# Patient Record
Sex: FEMALE | Race: WHITE | Employment: OTHER | URBAN - METROPOLITAN AREA
[De-identification: names, ages, dates, MRNs, and addresses within clinical notes are randomized per-mention and may not be internally consistent; named-entity substitution may affect disease eponyms.]

---

## 2017-02-02 ENCOUNTER — OFFICE VISIT (OUTPATIENT)
Dept: INTERNAL MEDICINE CLINIC | Age: 67
End: 2017-02-02

## 2017-02-02 ENCOUNTER — HOSPITAL ENCOUNTER (OUTPATIENT)
Dept: LAB | Age: 67
Discharge: HOME | End: 2017-02-02
Payer: MEDICARE

## 2017-02-02 VITALS
OXYGEN SATURATION: 100 % | HEIGHT: 68 IN | WEIGHT: 272 LBS | SYSTOLIC BLOOD PRESSURE: 140 MMHG | HEART RATE: 54 BPM | BODY MASS INDEX: 41.22 KG/M2 | DIASTOLIC BLOOD PRESSURE: 78 MMHG | RESPIRATION RATE: 18 BRPM | TEMPERATURE: 97.9 F

## 2017-02-02 DIAGNOSIS — I10 ESSENTIAL HYPERTENSION WITH GOAL BLOOD PRESSURE LESS THAN 140/90: Chronic | ICD-10-CM

## 2017-02-02 DIAGNOSIS — I10 ESSENTIAL HYPERTENSION WITH GOAL BLOOD PRESSURE LESS THAN 140/90: Primary | Chronic | ICD-10-CM

## 2017-02-02 DIAGNOSIS — E03.4 HYPOTHYROIDISM DUE TO ACQUIRED ATROPHY OF THYROID: Chronic | ICD-10-CM

## 2017-02-02 DIAGNOSIS — Z76.0 MEDICATION REFILL: ICD-10-CM

## 2017-02-02 DIAGNOSIS — Z51.81 MEDICATION MONITORING ENCOUNTER: ICD-10-CM

## 2017-02-02 LAB
ALBUMIN SERPL BCP-MCNC: 3.7 G/DL (ref 3.4–5)
ALBUMIN/GLOB SERPL: 1.2 {RATIO} (ref 0.8–1.7)
ALP SERPL-CCNC: 77 U/L (ref 45–117)
ALT SERPL-CCNC: 36 U/L (ref 13–56)
ANION GAP BLD CALC-SCNC: 8 MMOL/L (ref 3–18)
AST SERPL W P-5'-P-CCNC: 22 U/L (ref 15–37)
BASOPHILS # BLD AUTO: 0 K/UL (ref 0–0.06)
BASOPHILS # BLD: 0 % (ref 0–2)
BILIRUB SERPL-MCNC: 0.4 MG/DL (ref 0.2–1)
BUN SERPL-MCNC: 17 MG/DL (ref 7–18)
BUN/CREAT SERPL: 18 (ref 12–20)
CALCIUM SERPL-MCNC: 8.6 MG/DL (ref 8.5–10.1)
CHLORIDE SERPL-SCNC: 105 MMOL/L (ref 100–108)
CHOLEST SERPL-MCNC: 222 MG/DL
CO2 SERPL-SCNC: 29 MMOL/L (ref 21–32)
CREAT SERPL-MCNC: 0.97 MG/DL (ref 0.6–1.3)
DIFFERENTIAL METHOD BLD: NORMAL
EOSINOPHIL # BLD: 0.3 K/UL (ref 0–0.4)
EOSINOPHIL NFR BLD: 5 % (ref 0–5)
ERYTHROCYTE [DISTWIDTH] IN BLOOD BY AUTOMATED COUNT: 14.4 % (ref 11.6–14.5)
GLOBULIN SER CALC-MCNC: 3 G/DL (ref 2–4)
GLUCOSE SERPL-MCNC: 97 MG/DL (ref 74–99)
HCT VFR BLD AUTO: 41.8 % (ref 35–45)
HDLC SERPL-MCNC: 57 MG/DL (ref 40–60)
HDLC SERPL: 3.9 {RATIO} (ref 0–5)
HGB BLD-MCNC: 13.2 G/DL (ref 12–16)
LDLC SERPL CALC-MCNC: 127.2 MG/DL (ref 0–100)
LIPID PROFILE,FLP: ABNORMAL
LYMPHOCYTES # BLD AUTO: 36 % (ref 21–52)
LYMPHOCYTES # BLD: 2.3 K/UL (ref 0.9–3.6)
MCH RBC QN AUTO: 28.7 PG (ref 24–34)
MCHC RBC AUTO-ENTMCNC: 31.6 G/DL (ref 31–37)
MCV RBC AUTO: 90.9 FL (ref 74–97)
MONOCYTES # BLD: 0.5 K/UL (ref 0.05–1.2)
MONOCYTES NFR BLD AUTO: 7 % (ref 3–10)
NEUTS SEG # BLD: 3.4 K/UL (ref 1.8–8)
NEUTS SEG NFR BLD AUTO: 52 % (ref 40–73)
PLATELET # BLD AUTO: 318 K/UL (ref 135–420)
PMV BLD AUTO: 10.7 FL (ref 9.2–11.8)
POTASSIUM SERPL-SCNC: 4.2 MMOL/L (ref 3.5–5.5)
PROT SERPL-MCNC: 6.7 G/DL (ref 6.4–8.2)
RBC # BLD AUTO: 4.6 M/UL (ref 4.2–5.3)
SODIUM SERPL-SCNC: 142 MMOL/L (ref 136–145)
T4 FREE SERPL-MCNC: 1.1 NG/DL (ref 0.7–1.5)
TRIGL SERPL-MCNC: 189 MG/DL (ref ?–150)
TSH SERPL DL<=0.05 MIU/L-ACNC: 2.02 UIU/ML (ref 0.36–3.74)
VLDLC SERPL CALC-MCNC: 37.8 MG/DL
WBC # BLD AUTO: 6.4 K/UL (ref 4.6–13.2)

## 2017-02-02 PROCEDURE — 80053 COMPREHEN METABOLIC PANEL: CPT | Performed by: INTERNAL MEDICINE

## 2017-02-02 PROCEDURE — 85025 COMPLETE CBC W/AUTO DIFF WBC: CPT | Performed by: INTERNAL MEDICINE

## 2017-02-02 PROCEDURE — 36415 COLL VENOUS BLD VENIPUNCTURE: CPT | Performed by: INTERNAL MEDICINE

## 2017-02-02 PROCEDURE — 80061 LIPID PANEL: CPT | Performed by: INTERNAL MEDICINE

## 2017-02-02 PROCEDURE — 84439 ASSAY OF FREE THYROXINE: CPT | Performed by: INTERNAL MEDICINE

## 2017-02-02 RX ORDER — METOPROLOL SUCCINATE 25 MG/1
25 TABLET, EXTENDED RELEASE ORAL DAILY
Qty: 90 TAB | Refills: 5 | Status: SHIPPED | OUTPATIENT
Start: 2017-02-02 | End: 2018-03-25 | Stop reason: SDUPTHER

## 2017-02-02 RX ORDER — LEVOTHYROXINE SODIUM 100 UG/1
TABLET ORAL
Qty: 108 TAB | Refills: 5 | Status: SHIPPED | OUTPATIENT
Start: 2017-02-02 | End: 2018-02-28 | Stop reason: SDUPTHER

## 2017-02-02 RX ORDER — AMLODIPINE BESYLATE 5 MG/1
TABLET ORAL
Qty: 90 TAB | Refills: 5 | Status: SHIPPED | OUTPATIENT
Start: 2017-02-02 | End: 2018-05-01 | Stop reason: SDUPTHER

## 2017-02-02 RX ORDER — LEVOTHYROXINE SODIUM 100 UG/1
TABLET ORAL
Qty: 108 TAB | Refills: 5 | Status: SHIPPED | OUTPATIENT
Start: 2017-02-02 | End: 2017-02-02 | Stop reason: SDUPTHER

## 2017-02-02 RX ORDER — TRIAMTERENE AND HYDROCHLOROTHIAZIDE 75; 50 MG/1; MG/1
1 TABLET ORAL
Qty: 90 TAB | Refills: 5 | Status: SHIPPED | OUTPATIENT
Start: 2017-02-02

## 2017-02-02 RX ORDER — TRAMADOL HYDROCHLORIDE 50 MG/1
50 TABLET ORAL
Qty: 90 TAB | Refills: 5 | Status: SHIPPED | OUTPATIENT
Start: 2017-02-02 | End: 2019-06-27 | Stop reason: SDUPTHER

## 2017-02-02 NOTE — PROGRESS NOTES
ROOM # 4    Pt presents today for 4 month follow up HTN    Pt preferred language for health care discussion is english. Is someone accompanying this pt? no    Is the patient using any DME equipment during OV? no    Depression Screening completed. Yes    Learning Assessment completed. Yes    Abuse Screening completed. Yes    Health Maintenance reviewed and discussed per provider. Yes    Pt is due for FIT test, Bone Density, Pneumo, MWV, Eye exam.  Please order/place referral if appropriate. Advance Directive:  1. Do you have an advance directive in place? Patient Reply: No    2. If not, would you like material regarding how to put one in place? Patient Reply: No    Coordination of Care:  1. Have you been to the ER, urgent care clinic since your last visit? Hospitalized since your last visit? No    2. Have you seen or consulted any other health care providers outside of the [facility] since your last visit? Include any pap smears or colon screening.  No

## 2017-02-02 NOTE — MR AVS SNAPSHOT
Visit Information Date & Time Provider Department Dept. Phone Encounter #  
 2/2/2017  8:30 AM Kirk Pro"[employer] [phone] [id number] Follow-up Instructions Return in about 4 months (around 6/2/2017) for Welcome to Medicare Exam, . Upcoming Health Maintenance Date Due FOBT Q 1 YEAR AGE 50-75 12/10/2001 GLAUCOMA SCREENING Q2Y 12/10/2016 OSTEOPOROSIS SCREENING (DEXA) 12/10/2016 Pneumococcal 65+ Low/Medium Risk (1 of 2 - PCV13) 12/10/2016 MEDICARE YEARLY EXAM 12/10/2016 BREAST CANCER SCRN MAMMOGRAM 11/27/2017 DTaP/Tdap/Td series (2 - Td) 10/11/2020 Allergies as of 2/2/2017  Review Complete On: 2/2/2017 By: Kirk Pro MD  
  
 Severity Noted Reaction Type Reactions Erythromycin  10/22/2013    Swelling Current Immunizations  Never Reviewed Name Date TDAP Vaccine 10/11/2010 Not reviewed this visit You Were Diagnosed With   
  
 Codes Comments Essential hypertension with goal blood pressure less than 140/90    -  Primary ICD-10-CM: I10 
ICD-9-CM: 401.9 Hypothyroidism due to acquired atrophy of thyroid     ICD-10-CM: E03.4 ICD-9-CM: 244.8, 246.8 Medication refill     ICD-10-CM: Z76.0 ICD-9-CM: V68.1 Medication monitoring encounter     ICD-10-CM: Z51.81 
ICD-9-CM: V58.83 Vitals BP Pulse Temp Resp Height(growth percentile) Weight(growth percentile) 140/78 (!) 54 97.9 °F (36.6 °C) (Oral) 18 5' 8\" (1.727 m) 272 lb (123.4 kg) SpO2 BMI OB Status Smoking Status 100% 41.36 kg/m2 Hysterectomy Never Smoker Vitals History BMI and BSA Data Body Mass Index Body Surface Area  
 41.36 kg/m 2 2.43 m 2 Preferred Pharmacy Pharmacy Name Phone [address] [facility] [phone] Your Updated Medication List  
  
   
 This list is accurate as of: 2/2/17  9:05 AM.  Always use your most recent med list. amLODIPine 5 mg tablet Commonly known as:  Glory De Leon TAKE ONE TABLET BY MOUTH ONCE DAILY. INDICATIONA: HYPERTENSION  Indications: hypertension  
  
 levothyroxine 100 mcg tablet Commonly known as:  SYNTHROID  
TAKE TWO TABLETS BY MOUTH ON SUNDAY AND WEDNESDAY BEFORE BREAKFAST AND TAKE ONE TABLET BEFORE BREAKFAST ON ALL OTHER DAYS  Indications: hypothyroidism  
  
 metoprolol succinate 25 mg XL tablet Commonly known as:  TOPROL-XL Take 1 Tab by mouth daily. Indications: hypertension MOTRIN 800 mg tablet Generic drug:  ibuprofen Take 800 mg by mouth every eight (8) hours as needed for Pain.  
  
 traMADol 50 mg tablet Commonly known as:  ULTRAM  
Take 1 Tab by mouth every six (6) hours as needed for Pain.  
  
 triamterene-hydroCHLOROthiazide 75-50 mg per tablet Commonly known as:  [employer] [place] Take 1 Tab by mouth daily as needed. Indications: Edema Prescriptions Printed Refills  
 levothyroxine (SYNTHROID) 100 mcg tablet 5 Sig: TAKE TWO TABLETS BY MOUTH ON SUNDAY AND WEDNESDAY BEFORE BREAKFAST AND TAKE ONE TABLET BEFORE BREAKFAST ON ALL OTHER DAYS  Indications: hypothyroidism Class: Print  
 traMADol (ULTRAM) 50 mg tablet 5 Sig: Take 1 Tab by mouth every six (6) hours as needed for Pain. Class: Print Route: Oral  
  
Prescriptions Sent to Pharmacy Refills  
 amLODIPine (NORVASC) 5 mg tablet 5 Sig: TAKE ONE TABLET BY MOUTH ONCE DAILY. INDICATIONA: HYPERTENSION  Indications: hypertension Class: Normal  
 Pharmacy: [address], [address] Ph #: [phone]  
 metoprolol succinate (TOPROL-XL) 25 mg XL tablet 5 Sig: Take 1 Tab by mouth daily. Indications: hypertension Class: Normal  
 Pharmacy: [address], [address] Ph #: [phone]  Route: Oral  
 triamterene-hydroCHLOROthiazide (MAXZIDE) 75-50 mg per tablet 5 Sig: Take 1 Tab by mouth daily as needed. Indications: Edema Class: Normal  
 Pharmacy: [address], [address] [place] [address] #: [phone] Route: Oral  
  
Follow-up Instructions Return in about 4 months (around 6/2/2017) for Welcome to Medicare Exam, . To-Do List   
 02/02/2017 Lab:  CBC WITH AUTOMATED DIFF   
  
 02/02/2017 Lab:  LIPID PANEL   
  
 02/02/2017 Lab:  METABOLIC PANEL, COMPREHENSIVE   
  
 02/02/2017 Lab:  TSH AND FREE T4 Introducing [facility]! Dear Evangelina Luque: 
Thank you for requesting a "[employer]" account. Our records indicate that you already have an active "[employer]" account. You can access your account anytime at [email or web address] Did you know that you can access your hospital and ER discharge instructions at any time in "[employer]"? You can also review all of your test results from your hospital stay or ER visit. Additional Information If you have questions, please visit the Frequently Asked Questions section of the "[employer]" website at [email or web address] Remember, "[employer]" is NOT to be used for urgent needs. For medical emergencies, dial 911. Now available from your iPhone and Android! Please provide this summary of care documentation to your next provider. Your primary care clinician is listed as [facility]. If you have any questions after today's visit, please call [phone].

## 2017-02-02 NOTE — PROGRESS NOTES
HISTORY OF PRESENT ILLNESS  Brielle Potter is a 73 y.o. female. Visit Vitals    /73    Pulse (!) 54    Temp 97.9 °F (36.6 °C) (Oral)    Resp 18    Ht 5' 8\" (1.727 m)    Wt 272 lb (123.4 kg)    SpO2 100%    BMI 41.36 kg/m2       HPI Comments: Pt to have arthroscopic surgery of right knee later this month. Dr. aFnny Saba    Also recent URI with cough that lingered a month. But she managed to visit her family in [place]. Hypertension    The history is provided by the patient. This is a chronic problem. The current episode started more than 1 week ago. The problem has not changed since onset. Risk factors include obesity and a sedentary lifestyle. Review of Systems   Constitutional: Negative. Respiratory: Negative. Cardiovascular: Negative. Neurological: Negative. Physical Exam   Constitutional: She is oriented to person, place, and time. She appears well-developed and well-nourished. No distress. Cardiovascular: Normal rate and regular rhythm. Pulmonary/Chest: Effort normal and breath sounds normal.   Musculoskeletal: She exhibits edema (trace). Neurological: She is alert and oriented to person, place, and time. Skin: Skin is warm and dry. She is not diaphoretic. Psychiatric: She has a normal mood and affect. Nursing note and vitals reviewed. ASSESSMENT and PLAN    ICD-10-CM ICD-9-CM    1. Essential hypertension with goal blood pressure less than 140/90 I10 401.9 amLODIPine (NORVASC) 5 mg tablet      METABOLIC PANEL, COMPREHENSIVE      LIPID PANEL   2. Hypothyroidism due to acquired atrophy of thyroid E03.4 244.8 TSH AND FREE T4     246.8    3. Medication refill Z76.0 V68.1 triamterene-hydroCHLOROthiazide (MAXZIDE) 75-50 mg per tablet      traMADol (ULTRAM) 50 mg tablet       BP up --but took her meds late.  Coming down    update lab    F/u 4 months for Welcome to Medicare Exam

## 2017-03-20 ENCOUNTER — IMPORTED ENCOUNTER (OUTPATIENT)
Dept: URBAN - METROPOLITAN AREA CLINIC 1 | Facility: CLINIC | Age: 67
End: 2017-03-20

## 2017-03-20 PROBLEM — H01.004: Noted: 2017-03-20

## 2017-03-20 PROBLEM — H04.123: Noted: 2017-03-20

## 2017-03-20 PROBLEM — H16.143: Noted: 2017-03-20

## 2017-03-20 PROBLEM — H40.023: Noted: 2017-03-20

## 2017-03-20 PROBLEM — H01.001: Noted: 2017-03-20

## 2017-03-20 PROBLEM — H25.813: Noted: 2017-03-20

## 2017-03-20 PROCEDURE — 92015 DETERMINE REFRACTIVE STATE: CPT

## 2017-03-20 PROCEDURE — 92133 CPTRZD OPH DX IMG PST SGM ON: CPT

## 2017-03-20 PROCEDURE — 92012 INTRM OPH EXAM EST PATIENT: CPT

## 2017-03-20 NOTE — PATIENT DISCUSSION
1.  Glaucoma Suspect OU (CD 0.80 OU) : OCT essentially normal OU. Patient is considered high risk. Condition was discussed with patient and patient understands. Will continue to monitor patient for any progression in condition. Patient was advised to call us with any problems questions or concerns. 2.  CANDY w/ PEK OU-The use/continuation of artificial tears were recommended. 3.  Cataract OU: Observe for now without intervention. The patient was advised to contact us if any change or worsening of vision4. Blepharitis anterior type OU - Daily warm compresses and lid scrubs were recommended. Return for an appointment in 1 year 30/OCT/glare with Dr. Deja Patel.

## 2017-06-06 ENCOUNTER — OFFICE VISIT (OUTPATIENT)
Dept: INTERNAL MEDICINE CLINIC | Age: 67
End: 2017-06-06

## 2017-06-06 VITALS
DIASTOLIC BLOOD PRESSURE: 78 MMHG | SYSTOLIC BLOOD PRESSURE: 136 MMHG | WEIGHT: 273 LBS | HEART RATE: 53 BPM | RESPIRATION RATE: 18 BRPM | BODY MASS INDEX: 41.37 KG/M2 | HEIGHT: 68 IN | OXYGEN SATURATION: 100 % | TEMPERATURE: 97.7 F

## 2017-06-06 DIAGNOSIS — Z12.31 ENCOUNTER FOR SCREENING MAMMOGRAM FOR MALIGNANT NEOPLASM OF BREAST: ICD-10-CM

## 2017-06-06 DIAGNOSIS — Z00.00 ROUTINE GENERAL MEDICAL EXAMINATION AT A HEALTH CARE FACILITY: ICD-10-CM

## 2017-06-06 DIAGNOSIS — Z23 ENCOUNTER FOR IMMUNIZATION: ICD-10-CM

## 2017-06-06 DIAGNOSIS — Z12.11 SCREEN FOR COLON CANCER: ICD-10-CM

## 2017-06-06 DIAGNOSIS — Z13.39 SCREENING FOR ALCOHOLISM: ICD-10-CM

## 2017-06-06 DIAGNOSIS — Z78.0 POSTMENOPAUSAL: ICD-10-CM

## 2017-06-06 RX ORDER — DICLOFENAC SODIUM 50 MG/1
50 TABLET, DELAYED RELEASE ORAL 2 TIMES DAILY
Qty: 60 TAB | Refills: 11 | Status: SHIPPED | OUTPATIENT
Start: 2017-06-06 | End: 2017-09-12 | Stop reason: SDUPTHER

## 2017-06-06 RX ORDER — DICLOFENAC SODIUM 50 MG/1
TABLET, DELAYED RELEASE ORAL 2 TIMES DAILY
COMMUNITY
End: 2017-06-06 | Stop reason: SDUPTHER

## 2017-06-06 NOTE — PROGRESS NOTES
This is a \"Welcome to [employer]"  Initial Preventive Physical Examination (IPPE) providing Personalized Prevention Plan Services (Performed in the first 12 months of enrollment)    I have reviewed the patient's medical history in detail and updated the computerized patient record. History     Past Medical History:   Diagnosis Date    Delivery normal 1429,9941,0185    DJD (degenerative joint disease) of knee     hiatal hernia     Immunization, viral disease unk    hepatitis B series    Thyroid disease       Past Surgical History:   Procedure Laterality Date    ENDOSCOPY, COLON, DIAGNOSTIC  2007    HAND/FINGER SURGERY UNLISTED  2008    trigger finger    HX CYSTOCELE REPAIR      HX HYSTERECTOMY  1983    partial    HX KNEE ARTHROSCOPY Left 9/21/15    Dr. Lj Ambriz    HX TONSIL AND ADENOIDECTOMY      NASAL SURG PROC UNLISTED      polyps    REMOVAL GALLBLADDER      REPAIR OF RECTOCELE      REV LOWER EYELID EXTEN FAT PAD  9/18/2013     Current Outpatient Prescriptions   Medication Sig Dispense Refill    diclofenac EC (VOLTAREN) 50 mg EC tablet Take  by mouth two (2) times a day.  amLODIPine (NORVASC) 5 mg tablet TAKE ONE TABLET BY MOUTH ONCE DAILY. INDICATIONA: HYPERTENSION  Indications: hypertension 90 Tab 5    metoprolol succinate (TOPROL-XL) 25 mg XL tablet Take 1 Tab by mouth daily. Indications: hypertension 90 Tab 5    triamterene-hydroCHLOROthiazide (MAXZIDE) 75-50 mg per tablet Take 1 Tab by mouth daily as needed. Indications: Edema 90 Tab 5    traMADol (ULTRAM) 50 mg tablet Take 1 Tab by mouth every six (6) hours as needed for Pain. 90 Tab 5    levothyroxine (SYNTHROID) 100 mcg tablet TAKE TWO TABLETS BY MOUTH ON SUNDAY AND WEDNESDAY BEFORE BREAKFAST AND TAKE ONE TABLET BEFORE BREAKFAST ON ALL OTHER DAYS 108 Tab 5    ibuprofen (MOTRIN) 800 mg tablet Take 800 mg by mouth every eight (8) hours as needed for Pain. Allergies   Allergen Reactions    Erythromycin Swelling     Family History   Problem Relation Age of Onset    Hypertension Mother    [employer] Diabetes Mother     Arthritis-osteo Mother     Thyroid Disease Sister     Thyroid Disease Brother      Social History   Substance Use Topics    Smoking status: Never Smoker    Smokeless tobacco: Never Used    Alcohol use No     Diet, Lifestyle: exercises sporadically, nonsmoker, caffeine intake 1-2 drinks per day, alcohol intake rare, generally does not follow any diet    Exercise level: not active    Depression Risk Screen     PHQ over the last two weeks 6/6/2017   Little interest or pleasure in doing things Not at all   Feeling down, depressed or hopeless Not at all   Total Score PHQ 2 0     Alcohol Risk Screen   On any occasion during the past 3 months, have you had more than 3 drinks containing alcohol? No    Do you average more than 7 drinks per week? No    Functional Ability and Level of Safety     Hearing Loss   none    Activities of Daily Living   Self-care     Fall Risk Screen     Fall Risk Assessment, last 12 mths 6/6/2017   Able to walk? Yes   Fall in past 12 months? No     Abuse Screen   Patient is not abused    Review of Systems   A comprehensive review of systems was negative except for that written in the HPI. Leg pain bilat since knee replacement last year. Hand pain and stiffness    Physical Examination     No exam data present     Visit Vitals    /78    Pulse (!) 53    Temp 97.7 °F (36.5 °C) (Oral)    Resp 18    Ht 5' 8\" (1.727 m)    Wt 273 lb (123.8 kg)    SpO2 100%    BMI 41.51 kg/m2     General appearance: alert, cooperative, no distress, appears stated age  Eyes: conjunctivae/corneas clear. PERRL, EOM's intact.  Fundi benign  Throat: Lips, mucosa, and tongue normal. Teeth and gums normal  Lungs: clear to auscultation bilaterally  Breasts: normal appearance, no masses or tenderness  Heart: regular rate and rhythm, S1, S2 normal, no murmur, click, rub or gallop  Abdomen: soft, non-tender. Bowel sounds normal. No masses,  no organomegaly  Extremities: extremities normal, atraumatic, no cyanosis or edema. Good pulses in feet. Some varicose veins noted. Nothing obvious to cause leg pain. Neg SLRs. NML rerflexes  Neurologic: Grossly normal  3/3 objects remembered      Discussed weight, lifestyle, diet and exercise. Discussed her obesity and her leg pain. Offered to refer to Weight Loss Surgery      EKG Screening: normal EKG, normal sinus rhythm, unchanged from previous tracings. Patient Care Team:  Regla Alcazar MD as PCP - General (Internal Medicine)  Sophia Harris MD as Consulting Provider (Ophthalmology)  Irma Dillard MD as Consulting Provider (Orthopedic Surgery)     End-of-life planning  Advanced Directive discussed and documented: pt has some paperwork but needs to update it.  is her [de-identified], daughter is next. No life prolonging measures if poor hope of recovery. Assessment/Plan   Education and counseling provided:  Are appropriate based on today's review and evaluation  Pneumococcal Vaccine  Screening Mammography  Bone mass measurement (DEXA)    ICD-10-CM ICD-9-CM    1. Routine general medical examination at a health care facility Z00.00 V70.0 AMB POC EKG ROUTINE W/ 12 LEADS, SCREEN ()   2. Screening for alcoholism Z13.89 V79.1    3. Postmenopausal Z78.0 V49.81 DEXA BONE DENSITY STUDY AXIAL   4. Screen for colon cancer Z12.11 V76.51 REFERRAL TO GASTROENTEROLOGY   5. Encounter for immunization Z23 V03.89 ADMIN PNEUMOCOCCAL VACCINE      PNEUMOCOCCAL POLYSACCHARIDE VACCINE, 23-VALENT, ADULT OR IMMUNOSUPPRESSED PT DOSE,   6. Encounter for screening mammogram for malignant neoplasm of breast Z12.31 V76.12 RENEE MAMMO BI SCREENING INCL CAD   . Will f/u 3-4 months for BP check .   Encouraged to lose weight

## 2017-06-06 NOTE — MR AVS SNAPSHOT
Visit Information Date & Time Provider Department Dept. Phone Encounter #  
 6/6/2017  8:30 AM Chelo Gonzalez, [address] [facility] [phone] [id number] Follow-up Instructions Return in about 14 weeks (around 9/12/2017) for htn, thryoid disorder, cholesterol. Upcoming Health Maintenance Date Due FOBT Q 1 YEAR AGE 50-75 12/10/2001 GLAUCOMA SCREENING Q2Y 12/10/2016 OSTEOPOROSIS SCREENING (DEXA) 12/10/2016 Pneumococcal 65+ Low/Medium Risk (1 of 2 - PCV13) 12/10/2016 MEDICARE YEARLY EXAM 12/10/2016 INFLUENZA AGE 9 TO ADULT 8/1/2017 BREAST CANCER SCRN MAMMOGRAM 11/27/2017 DTaP/Tdap/Td series (2 - Td) 10/11/2020 Allergies as of 6/6/2017  Review Complete On: 6/6/2017 By: Chelo Gonzalez MD  
  
 Severity Noted Reaction Type Reactions Erythromycin  10/22/2013    Swelling Current Immunizations  Never Reviewed Name Date Pneumococcal Polysaccharide (PPSV-23)  Incomplete TDAP Vaccine 10/11/2010 Not reviewed this visit You Were Diagnosed With   
  
 Codes Comments Routine general medical examination at a health care facility     ICD-10-CM: Z00.00 ICD-9-CM: V70.0 Screening for alcoholism     ICD-10-CM: Z13.89 ICD-9-CM: V79.1 Postmenopausal     ICD-10-CM: Z78.0 ICD-9-CM: V49.81 Screen for colon cancer     ICD-10-CM: Z12.11 ICD-9-CM: V76.51 Encounter for immunization     ICD-10-CM: P42 ICD-9-CM: V03.89 Encounter for screening mammogram for malignant neoplasm of breast     ICD-10-CM: Z12.31 
ICD-9-CM: V76.12 Vitals BP Pulse Temp Resp Height(growth percentile) Weight(growth percentile) 136/78 (!) 53 97.7 °F (36.5 °C) (Oral) 18 5' 8\" (1.727 m) 273 lb (123.8 kg) SpO2 BMI OB Status Smoking Status 100% 41.51 kg/m2 Hysterectomy Never Smoker Vitals History BMI and BSA Data Body Mass Index Body Surface Area  41.51 kg/m 2 2.44 m 2  
  
  
 Preferred Pharmacy Pharmacy Name Phone [facility] [address] [facility], [facility] Your Updated Medication List  
  
   
This list is accurate as of: 6/6/17  9:11 AM.  Always use your most recent med list. amLODIPine 5 mg tablet Commonly known as:  Coni [employer] TAKE ONE TABLET BY MOUTH ONCE DAILY. INDICATIONA: HYPERTENSION  Indications: hypertension  
  
 diclofenac EC 50 mg EC tablet Commonly known as:  VOLTAREN Take 1 Tab by mouth two (2) times a day. Indications: OSTEOARTHRITIS  
  
 levothyroxine 100 mcg tablet Commonly known as:  SYNTHROID  
TAKE TWO TABLETS BY MOUTH ON SUNDAY AND WEDNESDAY BEFORE BREAKFAST AND TAKE ONE TABLET BEFORE BREAKFAST ON ALL OTHER DAYS  
  
 metoprolol succinate 25 mg XL tablet Commonly known as:  TOPROL-XL Take 1 Tab by mouth daily. Indications: hypertension MOTRIN 800 mg tablet Generic drug:  ibuprofen Take 800 mg by mouth every eight (8) hours as needed for Pain.  
  
 traMADol 50 mg tablet Commonly known as:  ULTRAM  
Take 1 Tab by mouth every six (6) hours as needed for Pain.  
  
 triamterene-hydroCHLOROthiazide 75-50 mg per tablet Commonly known as:  Ovid Muscat Take 1 Tab by mouth daily as needed. Indications: Edema Prescriptions Sent to Pharmacy Refills  
 diclofenac EC (VOLTAREN) 50 mg EC tablet 11 Sig: Take 1 Tab by mouth two (2) times a day. Indications: OSTEOARTHRITIS Class: Normal  
 Pharmacy: [address] [place] [address] #: [phone] Route: Oral  
  
We Performed the Following ADMIN PNEUMOCOCCAL VACCINE [ [facility]] AMB POC EKG ROUTINE W/ 12 LEADS, SCREEN () [ [facility]] PNEUMOCOCCAL POLYSACCHARIDE VACCINE, 23-VALENT, ADULT OR IMMUNOSUPPRESSED PT DOSE, [86451 CPT(R)] REFERRAL TO GASTROENTEROLOGY [CPG13 Custom] Follow-up Instructions Return in about 14 weeks (around 9/12/2017) for htn, thryoid disorder, cholesterol. To-Do List   
 06/09/2017 Imaging:  DEXA BONE DENSITY STUDY AXIAL   
  
 06/09/2017 Imaging:  RENEE MAMMO BI SCREENING INCL CAD Referral Information Referral ID Referred By Referred To [id number], [place]   
   [address] [place], [address] [facility] [address] Phone: [phone] Fax: [phone] Visits Status Start Date End Date 1 New Request 6/6/17 6/6/18 If your referral has a status of pending review or denied, additional information will be sent to support the outcome of this decision. Introducing [address]! Dear Shelton April: 
Thank you for requesting a [employer] account. Our records indicate that you already have an active [employer] account. You can access your account anytime at [email or web address] Did you know that you can access your hospital and ER discharge instructions at any time in [employer]? You can also review all of your test results from your hospital stay or ER visit. Additional Information If you have questions, please visit the Frequently Asked Questions section of the [employer] website at [email or web address] Remember, [employer] is NOT to be used for urgent needs. For medical emergencies, dial 911. Now available from your iPhone and Android! Please provide this summary of care documentation to your next provider. Your primary care clinician is listed as [facility]. If you have any questions after today's visit, please call [phone].

## 2017-06-06 NOTE — PROGRESS NOTES
Chief Complaint   Patient presents with   [address] [facility] Annual Wellness Visit     Welcome to Medicare physical       Pt preferred language for health care discussion is english. Is someone accompanying this pt? no    Is the patient using any DME equipment during OV? no    Depression Screening completed. Yes    Learning Assessment completed. Yes    Abuse Screening completed. Ys    Health Maintenance reviewed and discussed per provider. Yes    Pt is due for FIT test, Bone Density, Eye exam, Pneumo-13 or Peumo-23. Please order/place referral if appropriate. Advance Directive:  1. Do you have an advance directive in place? Patient Reply:no    2. If not, would you like material regarding how to put one in place? Patient Reply: No    Coordination of Care:  1. Have you been to the ER, urgent care clinic since your last visit? Hospitalized since your last visit? no    2. Have you seen or consulted any other health care providers outside of the [address] [place] [address] since your last visit? Include any pap smears or colon screening.  no

## 2017-09-12 ENCOUNTER — HOSPITAL ENCOUNTER (OUTPATIENT)
Dept: LAB | Age: 67
Discharge: HOME | End: 2017-09-12
Payer: MEDICARE

## 2017-09-12 ENCOUNTER — OFFICE VISIT (OUTPATIENT)
Dept: INTERNAL MEDICINE CLINIC | Age: 67
End: 2017-09-12

## 2017-09-12 VITALS
WEIGHT: 272.8 LBS | SYSTOLIC BLOOD PRESSURE: 134 MMHG | HEART RATE: 50 BPM | HEIGHT: 68 IN | OXYGEN SATURATION: 99 % | DIASTOLIC BLOOD PRESSURE: 67 MMHG | RESPIRATION RATE: 16 BRPM | BODY MASS INDEX: 41.34 KG/M2 | TEMPERATURE: 98.1 F

## 2017-09-12 DIAGNOSIS — E78.5 DYSLIPIDEMIA: ICD-10-CM

## 2017-09-12 DIAGNOSIS — I10 ESSENTIAL HYPERTENSION: Primary | Chronic | ICD-10-CM

## 2017-09-12 DIAGNOSIS — I10 ESSENTIAL HYPERTENSION: Chronic | ICD-10-CM

## 2017-09-12 DIAGNOSIS — E03.4 HYPOTHYROIDISM DUE TO ACQUIRED ATROPHY OF THYROID: Chronic | ICD-10-CM

## 2017-09-12 DIAGNOSIS — M19.91 PRIMARY OSTEOARTHRITIS, UNSPECIFIED SITE: ICD-10-CM

## 2017-09-12 DIAGNOSIS — N28.9 ACUTE RENAL INSUFFICIENCY: Primary | ICD-10-CM

## 2017-09-12 LAB
ALBUMIN SERPL-MCNC: 3.8 G/DL (ref 3.4–5)
ALBUMIN/GLOB SERPL: 1.2 {RATIO} (ref 0.8–1.7)
ALP SERPL-CCNC: 81 U/L (ref 45–117)
ALT SERPL-CCNC: 36 U/L (ref 13–56)
ANION GAP SERPL CALC-SCNC: 8 MMOL/L (ref 3–18)
AST SERPL-CCNC: 26 U/L (ref 15–37)
BILIRUB SERPL-MCNC: 0.3 MG/DL (ref 0.2–1)
BUN SERPL-MCNC: 31 MG/DL (ref 7–18)
BUN/CREAT SERPL: 19 (ref 12–20)
CALCIUM SERPL-MCNC: 9.3 MG/DL (ref 8.5–10.1)
CHLORIDE SERPL-SCNC: 106 MMOL/L (ref 100–108)
CHOLEST SERPL-MCNC: 247 MG/DL
CO2 SERPL-SCNC: 28 MMOL/L (ref 21–32)
CREAT SERPL-MCNC: 1.61 MG/DL (ref 0.6–1.3)
GLOBULIN SER CALC-MCNC: 3.2 G/DL (ref 2–4)
GLUCOSE SERPL-MCNC: 114 MG/DL (ref 74–99)
HDLC SERPL-MCNC: 56 MG/DL (ref 40–60)
HDLC SERPL: 4.4 {RATIO} (ref 0–5)
LDLC SERPL CALC-MCNC: 148.6 MG/DL (ref 0–100)
LIPID PROFILE,FLP: ABNORMAL
POTASSIUM SERPL-SCNC: 4.5 MMOL/L (ref 3.5–5.5)
PROT SERPL-MCNC: 7 G/DL (ref 6.4–8.2)
SODIUM SERPL-SCNC: 142 MMOL/L (ref 136–145)
T4 FREE SERPL-MCNC: 1.3 NG/DL (ref 0.7–1.5)
TRIGL SERPL-MCNC: 212 MG/DL (ref ?–150)
TSH SERPL DL<=0.05 MIU/L-ACNC: 3.88 UIU/ML (ref 0.36–3.74)
VLDLC SERPL CALC-MCNC: 42.4 MG/DL

## 2017-09-12 PROCEDURE — 84439 ASSAY OF FREE THYROXINE: CPT | Performed by: INTERNAL MEDICINE

## 2017-09-12 PROCEDURE — 80061 LIPID PANEL: CPT | Performed by: INTERNAL MEDICINE

## 2017-09-12 PROCEDURE — 36415 COLL VENOUS BLD VENIPUNCTURE: CPT | Performed by: INTERNAL MEDICINE

## 2017-09-12 PROCEDURE — 80053 COMPREHEN METABOLIC PANEL: CPT | Performed by: INTERNAL MEDICINE

## 2017-09-12 RX ORDER — DICLOFENAC SODIUM 50 MG/1
50 TABLET, DELAYED RELEASE ORAL 2 TIMES DAILY
Qty: 180 TAB | Refills: 4 | Status: SHIPPED | OUTPATIENT
Start: 2017-09-12 | End: 2018-09-27 | Stop reason: SDUPTHER

## 2017-09-12 RX ORDER — PRAVASTATIN SODIUM 40 MG/1
40 TABLET ORAL
Qty: 30 TAB | Refills: 5 | Status: SHIPPED | OUTPATIENT
Start: 2017-09-12 | End: 2019-06-27

## 2017-09-12 NOTE — PROGRESS NOTES
Chief Complaint   Patient presents with    Hypertension    Thyroid Problem    Cholesterol Problem       Pt preferred language for health care discussion is English. Is someone accompanying this pt? no    Is the patient using any DME equipment during OV? no    Depression Screening:  PHQ over the last two weeks 9/12/2017 6/6/2017 2/2/2017 9/12/2016 6/7/2016 4/21/2016 6/24/2015   Little interest or pleasure in doing things Not at all Not at all Not at all Not at all Not at all Not at all Not at all   Feeling down, depressed or hopeless Not at all Not at all Not at all Not at all Not at all Not at all Not at all   Total Score PHQ 2 0 0 0 0 0 0 0       Learning Assessment:  Learning Assessment 6/24/2015   PRIMARY LEARNER Patient   HIGHEST LEVEL OF EDUCATION - PRIMARY LEARNER  [address] PRIMARY LEARNER NONE   PRIMARY LANGUAGE ENGLISH   LEARNER PREFERENCE PRIMARY DEMONSTRATION   ANSWERED BY Patient   RELATIONSHIP SELF       Abuse Screening:  Abuse Screening Questionnaire 6/24/2015   Do you ever feel afraid of your partner? N   Are you in a relationship with someone who physically or mentally threatens you? N   Is it safe for you to go home? Y       Fall Risk  Fall Risk Assessment, last 12 mths 9/12/2017 6/6/2017 2/2/2017   Able to walk? Yes Yes Yes   Fall in past 12 months? No No No         Health Maintenance reviewed and discussed per provider. Yes    Pt is due for  Bone Density (already ordered), Eye exam (release filled out and faxed). Please order/place referral if appropriate. Pt currently taking Antiplatelet therapy? no      Advance Directive:  1. Do you have an advance directive in place? Patient Reply:yes, will bring in a copy    2. If not, would you like material regarding how to put one in place? Patient Reply: n/a    Coordination of Care:  1. Have you been to the ER, urgent care clinic since your last visit? Hospitalized since your last visit? no    2.  Have you seen or consulted any other health care providers outside of the [address] [place] [address] since your last visit? Include any pap smears or colon screening. no      Have you had any of the following? 1. Brain: memory or focus problems, headaches, passing out? Pt responded:  No    2. Eyes: blurred, double or troubled vision? Pt responded:  No    Have you seen an eye doctor this year? Pt responded:  Yes    3. Heart: high BP, palpitations, fainting? Pt responded:  No    4. Stomach: nausea, bloating, weight gain or loss? Pt responded:  No    5. Urinary tract/kidneys: frequent yeast or bacterial infections? Pt responded:  No    Discoloration or different smelling urine? Pt responded:  No    Have you seen a doctor for this? Pt responded:  No    6. Veins: leg swelling, discoloration, high cholesterol? Pt responded:  No    7. Feet: any cuts or sores that won't heal?     Pt responded:  No   Recent visits to podiatrists or foot specialist?     Pt responded:  No      Any and all medication changes made under Dr. Nitin Neri verbal orders.

## 2017-09-12 NOTE — PROGRESS NOTES
HISTORY OF PRESENT ILLNESS  Mikey Garrido is a 73 y.o. female. Visit Vitals    /67    Pulse (!) 50    Temp 98.1 °F (36.7 °C) (Oral)    Resp 16    Ht 5' 8\" (1.727 m)    Wt 272 lb 12.8 oz (123.7 kg)    SpO2 99%    BMI 41.48 kg/m2       Hypertension    The history is provided by the patient. This is a chronic problem. The current episode started more than 1 week ago. The problem has not changed since onset. Pertinent negatives include no chest pain, no palpitations, no dizziness and no shortness of breath. There are no associated agents to hypertension. Risk factors include obesity and postmenopause. Thyroid Problem   The history is provided by the patient. This is a chronic problem. The current episode started more than 1 week ago. The problem occurs daily. The problem has not changed since onset. Pertinent negatives include no chest pain and no shortness of breath. The symptoms are relieved by medications. Cholesterol Problem   The history is provided by the patient. This is a chronic problem. The current episode started more than 1 week ago. The problem occurs daily. The problem has not changed since onset. Pertinent negatives include no chest pain and no shortness of breath. Exacerbated by: diet. The symptoms are relieved by medications (diet). Review of Systems   Constitutional: Negative. Respiratory: Negative for cough and shortness of breath. Cardiovascular: Negative for chest pain and palpitations. Neurological: Negative. Negative for dizziness. Physical Exam   Constitutional: She is oriented to person, place, and time. She appears well-developed and well-nourished. No distress. Cardiovascular: Normal rate and regular rhythm. Pulmonary/Chest: Effort normal and breath sounds normal.   Musculoskeletal: She exhibits no edema. Neurological: She is alert and oriented to person, place, and time. Skin: Skin is warm and dry. She is not diaphoretic.    Psychiatric: She has a normal mood and affect. Nursing note and vitals reviewed. ASSESSMENT and PLAN    ICD-10-CM ICD-9-CM    1. Essential hypertension N90 148.7 METABOLIC PANEL, COMPREHENSIVE      LIPID PANEL   2. Hypothyroidism due to acquired atrophy of thyroid E03.4 244.8 TSH AND FREE T4     246.8    3.  Primary osteoarthritis, unspecified site M19.91 715.10 diclofenac EC (VOLTAREN) 50 mg EC tablet     BP controlled    Update lab today    Discussed flu vacc--she gets at work    F/u 6 months for HTN, Thyroid

## 2017-09-13 NOTE — PROGRESS NOTES
? Acute renal insufficiency vs lab error. Repeat BMP ordered  Worsening cholesterol. Pravastatin ordered.

## 2017-10-26 DIAGNOSIS — Z78.0 POSTMENOPAUSAL: ICD-10-CM

## 2018-01-22 ENCOUNTER — HOSPITAL ENCOUNTER (OUTPATIENT)
Dept: LAB | Age: 68
Discharge: HOME | End: 2018-01-22
Payer: MEDICARE

## 2018-01-22 DIAGNOSIS — N28.9 ACUTE RENAL INSUFFICIENCY: ICD-10-CM

## 2018-01-22 LAB
ANION GAP SERPL CALC-SCNC: 5 MMOL/L (ref 3–18)
BUN SERPL-MCNC: 34 MG/DL (ref 7–18)
BUN/CREAT SERPL: 24 (ref 12–20)
CALCIUM SERPL-MCNC: 8.9 MG/DL (ref 8.5–10.1)
CHLORIDE SERPL-SCNC: 107 MMOL/L (ref 100–108)
CO2 SERPL-SCNC: 29 MMOL/L (ref 21–32)
CREAT SERPL-MCNC: 1.41 MG/DL (ref 0.6–1.3)
GLUCOSE SERPL-MCNC: 104 MG/DL (ref 74–99)
POTASSIUM SERPL-SCNC: 4.4 MMOL/L (ref 3.5–5.5)
SODIUM SERPL-SCNC: 141 MMOL/L (ref 136–145)

## 2018-01-22 PROCEDURE — 80048 BASIC METABOLIC PNL TOTAL CA: CPT | Performed by: INTERNAL MEDICINE

## 2018-01-22 PROCEDURE — 36415 COLL VENOUS BLD VENIPUNCTURE: CPT | Performed by: INTERNAL MEDICINE

## 2018-02-28 RX ORDER — LEVOTHYROXINE SODIUM 100 UG/1
TABLET ORAL
Qty: 108 TAB | Refills: 5 | Status: SHIPPED | OUTPATIENT
Start: 2018-02-28 | End: 2018-10-28

## 2018-03-25 RX ORDER — METOPROLOL SUCCINATE 25 MG/1
TABLET, EXTENDED RELEASE ORAL
Qty: 90 TAB | Refills: 5 | Status: SHIPPED | OUTPATIENT
Start: 2018-03-25 | End: 2019-06-22 | Stop reason: SDUPTHER

## 2018-03-26 ENCOUNTER — OFFICE VISIT (OUTPATIENT)
Dept: INTERNAL MEDICINE CLINIC | Age: 68
End: 2018-03-26

## 2018-03-26 VITALS
RESPIRATION RATE: 14 BRPM | HEART RATE: 53 BPM | TEMPERATURE: 98.4 F | OXYGEN SATURATION: 95 % | DIASTOLIC BLOOD PRESSURE: 79 MMHG | WEIGHT: 272 LBS | HEIGHT: 68 IN | SYSTOLIC BLOOD PRESSURE: 149 MMHG | BODY MASS INDEX: 41.22 KG/M2

## 2018-03-26 DIAGNOSIS — I10 ESSENTIAL HYPERTENSION: Chronic | ICD-10-CM

## 2018-03-26 DIAGNOSIS — E03.4 HYPOTHYROIDISM DUE TO ACQUIRED ATROPHY OF THYROID: Chronic | ICD-10-CM

## 2018-03-26 DIAGNOSIS — M25.511 ACUTE PAIN OF RIGHT SHOULDER: ICD-10-CM

## 2018-03-26 DIAGNOSIS — M79.671 RIGHT FOOT PAIN: ICD-10-CM

## 2018-03-26 DIAGNOSIS — E79.0 ELEVATED URIC ACID IN BLOOD: ICD-10-CM

## 2018-03-26 DIAGNOSIS — J01.00 ACUTE MAXILLARY SINUSITIS, RECURRENCE NOT SPECIFIED: Primary | ICD-10-CM

## 2018-03-26 PROBLEM — E66.01 OBESITY, MORBID (HCC): Status: ACTIVE | Noted: 2018-03-26

## 2018-03-26 RX ORDER — CODEINE PHOSPHATE AND GUAIFENESIN 10; 100 MG/5ML; MG/5ML
5 SOLUTION ORAL
Qty: 180 ML | Refills: 0 | Status: SHIPPED | OUTPATIENT
Start: 2018-03-26 | End: 2018-06-26 | Stop reason: ALTCHOICE

## 2018-03-26 RX ORDER — AMOXICILLIN 500 MG/1
500 CAPSULE ORAL 3 TIMES DAILY
Qty: 30 CAP | Refills: 0 | Status: SHIPPED | OUTPATIENT
Start: 2018-03-26 | End: 2018-04-05

## 2018-03-26 RX ORDER — ALLOPURINOL 100 MG/1
100 TABLET ORAL DAILY
Qty: 30 TAB | Refills: 5 | Status: SHIPPED | OUTPATIENT
Start: 2018-03-26 | End: 2018-06-26 | Stop reason: SDUPTHER

## 2018-03-26 NOTE — MR AVS SNAPSHOT
[address] [place] [address] 
 
 
 [address] [facility] [address] 
[phone] Patient: Chris Landry MRN: [id number] Visit Information Date & Time Provider Department Dept. Phone Encounter #  
 3/26/2018 10:30 AM [id number] Follow-up Instructions Return in about 3 months (around 6/26/2018) for Medicare Wellness Visit, htn, cholesterol. Upcoming Health Maintenance Date Due  
 GLAUCOMA SCREENING Q2Y 12/10/2016 Influenza Age 5 to Adult 8/1/2017 Pneumococcal 65+ Low/Medium Risk (2 of 2 - PCV13) 6/6/2018 MEDICARE YEARLY EXAM 6/7/2018 BREAST CANCER SCRN MAMMOGRAM 8/17/2019 COLONOSCOPY 7/10/2020 DTaP/Tdap/Td series (2 - Td) 10/11/2020 Allergies as of 3/26/2018  Review Complete On: 3/26/2018 By: Julia Rich LPN Severity Noted Reaction Type Reactions Erythromycin  10/22/2013    Swelling Statins-hmg-coa Reductase Inhibitors  03/26/2018    Myalgia Current Immunizations  Never Reviewed Name Date Pneumococcal Polysaccharide (PPSV-23) 6/6/2017  9:35 AM  
 TDAP Vaccine 10/11/2010 Zoster Vaccine, Live 12/12/2014 Not reviewed this visit You Were Diagnosed With   
  
 Codes Comments Acute maxillary sinusitis, recurrence not specified    -  Primary ICD-10-CM: J01.00 ICD-9-CM: 461.0 Acute pain of right shoulder     ICD-10-CM: M25.511 ICD-9-CM: 719.41 Essential hypertension     ICD-10-CM: I10 
ICD-9-CM: 401.9 Right foot pain     ICD-10-CM: M79.671 ICD-9-CM: 729.5 Elevated uric acid in blood     ICD-10-CM: E79.0 ICD-9-CM: 790.6 Hypothyroidism due to acquired atrophy of thyroid     ICD-10-CM: E03.4 ICD-9-CM: 244.8, 246.8 Vitals BP Pulse Temp Resp Height(growth percentile) Weight(growth percentile) 149/79 (!) 53 98.4 °F (36.9 °C) (Oral) 14 5' 8\" (1.727 m) 272 lb (123.4 kg) SpO2 BMI OB Status Smoking Status 95% 41.36 kg/m2 Hysterectomy Never Smoker Vitals History BMI and BSA Data Body Mass Index Body Surface Area  
 41.36 kg/m 2 2.43 m 2 Preferred Pharmacy Pharmacy Name Phone [facility] [phone] Your Updated Medication List  
  
   
This list is accurate as of 3/26/18 11:33 AM.  Always use your most recent med list.  
  
  
  
  
 allopurinol 100 mg tablet Commonly known as:  Speedy Dai Take 1 Tab by mouth daily. amLODIPine 5 mg tablet Commonly known as:  Silva Nielson TAKE ONE TABLET BY MOUTH ONCE DAILY. INDICATIONA: HYPERTENSION  Indications: hypertension  
  
 amoxicillin 500 mg capsule Commonly known as:  AMOXIL Take 1 Cap by mouth three (3) times daily for 10 days. diclofenac EC 50 mg EC tablet Commonly known as:  VOLTAREN Take 1 Tab by mouth two (2) times a day. Indications: OSTEOARTHRITIS  
  
 guaiFENesin-codeine 100-10 mg/5 mL solution Commonly known as:  ROBITUSSIN AC Take 5 mL by mouth three (3) times daily as needed for Cough. Max Daily Amount: 15 mL. levothyroxine 100 mcg tablet Commonly known as:  SYNTHROID  
TAKE 2 TABLETS BEFORE BREAKFAST ON SUNDAY &  WEDNESDAY AND 1 TABLET BEFORE BREAKFAST ON ALL OTHER DAYS. MOTRIN 800 mg tablet Generic drug:  ibuprofen Take 800 mg by mouth every eight (8) hours as needed for Pain.  
  
 pravastatin 40 mg tablet Commonly known as:  PRAVACHOL Take 1 Tab by mouth nightly. Indications: hypercholesterolemia TOPROL XL 25 mg XL tablet Generic drug:  metoprolol succinate TAKE 1 TABLET DAILY FOR HYPERTENSION  
  
 traMADol 50 mg tablet Commonly known as:  ULTRAM  
Take 1 Tab by mouth every six (6) hours as needed for Pain.  
  
 triamterene-hydroCHLOROthiazide 75-50 mg per tablet Commonly known as:  Rosie Champagne Take 1 Tab by mouth daily as needed. Indications: Edema Prescriptions Printed Refills  
 guaiFENesin-codeine (ROBITUSSIN AC) 100-10 mg/5 mL solution 0 Sig: Take 5 mL by mouth three (3) times daily as needed for Cough. Max Daily Amount: 15 mL. Class: Print Route: Oral  
  
Prescriptions Sent to Pharmacy Refills  
 amoxicillin (AMOXIL) 500 mg capsule 0 Sig: Take 1 Cap by mouth three (3) times daily for 10 days. Class: Normal  
 Pharmacy: [address] Ph #: [phone] Route: Oral  
 allopurinol (ZYLOPRIM) 100 mg tablet 5 Sig: Take 1 Tab by mouth daily. Class: Normal  
 Pharmacy: [address] Ph #: [phone] Route: Oral  
  
Follow-up Instructions Return in about 3 months (around 6/26/2018) for Medicare Wellness Visit, htn, cholesterol. To-Do List   
 03/26/2018 Lab:  TSH AND FREE T4   
  
 04/09/2018 Lab:  METABOLIC PANEL, BASIC   
  
 04/09/2018 Lab:  URIC ACID Introducing [facility] SERVICES! Dear Tyler Villalobos: 
Thank you for requesting a [employer] account. Our records indicate that you already have an active [employer] account. You can access your account anytime at [email or web address] Did you know that you can access your hospital and ER discharge instructions at any time in [employer]? You can also review all of your test results from your hospital stay or ER visit. Additional Information If you have questions, please visit the Frequently Asked Questions section of the [employer] website at [email or web address] Remember, [employer] is NOT to be used for urgent needs. For medical emergencies, dial 911. Now available from your iPhone and Android! Please provide this summary of care documentation to your next provider. Your primary care clinician is listed as [facility].  If you have any questions after today's visit, please call [phone].

## 2018-03-26 NOTE — PROGRESS NOTES
HISTORY OF PRESENT ILLNESS  Ariane Velazquez is a 78 y.o. female. Visit Vitals    /79    Pulse (!) 53    Temp 98.4 °F (36.9 °C) (Oral)    Resp 14    Ht 5' 8\" (1.727 m)    Wt 272 lb (123.4 kg)    SpO2 95%    BMI 41.36 kg/m2       HPI Comments: Her podiatrist checked her lab for foot pain and her uric acid was high. Hypertension    The history is provided by the patient. This is a chronic problem. The current episode started more than 1 week ago. The problem has not changed since onset. There are no associated agents to hypertension. Risk factors include obesity and postmenopause. Sinus Pain    The history is provided by the patient (has been in [place], flew back). This is a new problem. The current episode started more than 1 week ago. The problem has not changed since onset. Associated symptoms include chills, congestion, hoarse voice, sinus pressure, cough and rhinorrhea. Associated symptoms comments: Only able to get some phlegm in the morning. It is greenish gray. Treatments tried: allegra d, hot tea. The treatment provided no relief. Shoulder Pain    The history is provided by the patient (has had surgery on this shoulder twice. This AM awoke with pain). The incident occurred 1 to 2 hours ago. There was no injury mechanism. The right shoulder is affected. The pain is moderate. The pain has been constant since onset. Pertinent negatives include no numbness. Review of Systems   Constitutional: Positive for chills. HENT: Positive for congestion, hoarse voice, rhinorrhea, sinus pain and sinus pressure. Respiratory: Positive for cough. Neurological: Negative for numbness. Physical Exam   Constitutional: She is oriented to person, place, and time. She appears well-developed and well-nourished. No distress. HENT:   Right Ear: Tympanic membrane, external ear and ear canal normal.   Left Ear: Tympanic membrane, external ear and ear canal normal.   Nose: Mucosal edema present. Right sinus exhibits frontal sinus tenderness. Mouth/Throat: Uvula is midline, oropharynx is clear and moist and mucous membranes are normal.   Eyes: Conjunctivae are normal.   Cardiovascular: Normal rate and regular rhythm. Pulmonary/Chest: Effort normal and breath sounds normal.   Slight wheeze when coughing deeply   Musculoskeletal: She exhibits no edema. Right shoulder: She exhibits decreased range of motion, tenderness and pain. She exhibits no spasm and normal pulse. Lymphadenopathy:     She has no cervical adenopathy. Neurological: She is alert and oriented to person, place, and time. Skin: Skin is warm and dry. She is not diaphoretic. Psychiatric: She has a normal mood and affect. Nursing note and vitals reviewed. ASSESSMENT and PLAN    ICD-10-CM ICD-9-CM    1. Acute maxillary sinusitis, recurrence not specified J01.00 461.0 amoxicillin (AMOXIL) 500 mg capsule      guaiFENesin-codeine (ROBITUSSIN AC) 100-10 mg/5 mL solution   2. Acute pain of right shoulder M25.511 719.41    3. Essential hypertension W25 090.6 METABOLIC PANEL, BASIC   4. Right foot pain M79.671 729.5 allopurinol (ZYLOPRIM) 100 mg tablet   5. Elevated uric acid in blood E79.0 790.6 allopurinol (ZYLOPRIM) 100 mg tablet      URIC ACID   6. Hypothyroidism due to acquired atrophy of thyroid E03.4 244.8 TSH AND FREE T4     246.8        Sinusitis Will tx with antibiotic  Codeine for cough    Re shoulder--topical NSAIDs and heat for 2-3 days with light ROM stretches. If not getting better, contact ortho since she has already had surgery and they know her well    Will start allopurinol. Recheck uric acid in 3-4 weeks. Will need kidney function updated--while well hydrated to recheck GFR. May need to see kidney specialist (she reports when she had hysterectomy she had kidney damage and awoke with nephrostomy tubes. She had internal bleeding and probably hypotension. This was in 26.     F/u 3 months for MWV and BP check

## 2018-03-26 NOTE — PROGRESS NOTES
Patient presents to the clinic for a follow up on her blood pressure. Patient would also like to discuss sinus pain that began 1 week ago. Patient would like to get a refill of her tramadol. Flu Shot Requested: received    Depression Screening:  PHQ over the last two weeks 3/26/2018 9/12/2017 6/6/2017 2/2/2017 9/12/2016 6/7/2016 4/21/2016   Little interest or pleasure in doing things Not at all Not at all Not at all Not at all Not at all Not at all Not at all   Feeling down, depressed or hopeless Not at all Not at all Not at all Not at all Not at all Not at all Not at all   Total Score PHQ 2 0 0 0 0 0 0 0       Learning Assessment:  Learning Assessment 6/24/2015   PRIMARY LEARNER Patient   HIGHEST LEVEL OF EDUCATION - PRIMARY LEARNER  [id number] [address]am PRIMARY LEARNER NONE   PRIMARY LANGUAGE ENGLISH   LEARNER PREFERENCE PRIMARY DEMONSTRATION   ANSWERED BY Patient   RELATIONSHIP SELF       Abuse Screening:  Abuse Screening Questionnaire 6/24/2015   Do you ever feel afraid of your partner? N   Are you in a relationship with someone who physically or mentally threatens you? N   Is it safe for you to go home? Y       Health Maintenance reviewed and discussed per provider: yes     Coordination of Care:    1. Have you been to the ER, urgent care clinic since your last visit? Hospitalized since your last visit? no    2. Have you seen or consulted any other health care providers outside of the [address] [place] [address] since your last visit? Include any pap smears or colon screening.  Yes, podiatry, Dr. Makayla Valderrama

## 2018-04-10 ENCOUNTER — IMPORTED ENCOUNTER (OUTPATIENT)
Dept: URBAN - METROPOLITAN AREA CLINIC 1 | Facility: CLINIC | Age: 68
End: 2018-04-10

## 2018-04-10 PROBLEM — H40.013: Noted: 2018-04-10

## 2018-04-10 PROBLEM — H04.123: Noted: 2018-04-10

## 2018-04-10 PROBLEM — H16.143: Noted: 2018-04-10

## 2018-04-10 PROCEDURE — 92014 COMPRE OPH EXAM EST PT 1/>: CPT

## 2018-04-10 PROCEDURE — 92133 CPTRZD OPH DX IMG PST SGM ON: CPT

## 2018-04-10 NOTE — PATIENT DISCUSSION
1.  Glaucoma Suspect OU (CD 0.80 OU) :  Fm hx. IOP stable on no meds. OCT WNL OU Today. Cont to observe. 2. CANDY w/ PEK OU- Cont ATs TID OU routinely. 3.  Cataract OU: Observe for now without intervention. The patient was advised to contact us if any change or worsening of vision4. Anterior Blepharitis OU - Cont Daily warm compresses and lid scrubs were recommended. Return for an appointment in 1 yr 30 glare (no testing) with Dr. Mena Last.

## 2018-04-18 ENCOUNTER — HOSPITAL ENCOUNTER (OUTPATIENT)
Dept: LAB | Age: 68
Discharge: HOME | End: 2018-04-18
Payer: MEDICARE

## 2018-04-18 DIAGNOSIS — E79.0 ELEVATED URIC ACID IN BLOOD: ICD-10-CM

## 2018-04-18 DIAGNOSIS — E03.4 HYPOTHYROIDISM DUE TO ACQUIRED ATROPHY OF THYROID: Chronic | ICD-10-CM

## 2018-04-18 DIAGNOSIS — I10 ESSENTIAL HYPERTENSION: Chronic | ICD-10-CM

## 2018-04-18 LAB
ANION GAP SERPL CALC-SCNC: 7 MMOL/L (ref 3–18)
BUN SERPL-MCNC: 30 MG/DL (ref 7–18)
BUN/CREAT SERPL: 20 (ref 12–20)
CALCIUM SERPL-MCNC: 8.5 MG/DL (ref 8.5–10.1)
CHLORIDE SERPL-SCNC: 108 MMOL/L (ref 100–108)
CO2 SERPL-SCNC: 27 MMOL/L (ref 21–32)
CREAT SERPL-MCNC: 1.5 MG/DL (ref 0.6–1.3)
GLUCOSE SERPL-MCNC: 104 MG/DL (ref 74–99)
POTASSIUM SERPL-SCNC: 4.7 MMOL/L (ref 3.5–5.5)
SODIUM SERPL-SCNC: 142 MMOL/L (ref 136–145)
T4 FREE SERPL-MCNC: 1.3 NG/DL (ref 0.7–1.5)
TSH SERPL DL<=0.05 MIU/L-ACNC: 4.04 UIU/ML (ref 0.36–3.74)

## 2018-04-18 PROCEDURE — 84550 ASSAY OF BLOOD/URIC ACID: CPT | Performed by: INTERNAL MEDICINE

## 2018-04-18 PROCEDURE — 36415 COLL VENOUS BLD VENIPUNCTURE: CPT | Performed by: INTERNAL MEDICINE

## 2018-04-18 PROCEDURE — 80048 BASIC METABOLIC PNL TOTAL CA: CPT | Performed by: INTERNAL MEDICINE

## 2018-04-18 PROCEDURE — 84439 ASSAY OF FREE THYROXINE: CPT | Performed by: INTERNAL MEDICINE

## 2018-04-19 LAB — URATE SERPL-MCNC: 5.9 MG/DL (ref 2.6–7.2)

## 2018-05-01 DIAGNOSIS — I10 ESSENTIAL HYPERTENSION WITH GOAL BLOOD PRESSURE LESS THAN 140/90: Chronic | ICD-10-CM

## 2018-05-02 RX ORDER — AMLODIPINE BESYLATE 5 MG/1
TABLET ORAL
Qty: 90 TAB | Refills: 5 | Status: SHIPPED | OUTPATIENT
Start: 2018-05-02 | End: 2019-07-10 | Stop reason: SDUPTHER

## 2018-06-26 ENCOUNTER — OFFICE VISIT (OUTPATIENT)
Dept: INTERNAL MEDICINE CLINIC | Age: 68
End: 2018-06-26

## 2018-06-26 VITALS
TEMPERATURE: 97.9 F | HEART RATE: 57 BPM | OXYGEN SATURATION: 98 % | RESPIRATION RATE: 16 BRPM | SYSTOLIC BLOOD PRESSURE: 124 MMHG | WEIGHT: 277 LBS | DIASTOLIC BLOOD PRESSURE: 76 MMHG | BODY MASS INDEX: 41.98 KG/M2 | HEIGHT: 68 IN

## 2018-06-26 DIAGNOSIS — I10 ESSENTIAL HYPERTENSION: Chronic | ICD-10-CM

## 2018-06-26 DIAGNOSIS — Z76.0 MEDICATION REFILL: ICD-10-CM

## 2018-06-26 DIAGNOSIS — Z00.00 MEDICARE ANNUAL WELLNESS VISIT, SUBSEQUENT: Primary | ICD-10-CM

## 2018-06-26 DIAGNOSIS — E03.4 HYPOTHYROIDISM DUE TO ACQUIRED ATROPHY OF THYROID: Chronic | ICD-10-CM

## 2018-06-26 DIAGNOSIS — E66.01 OBESITY, MORBID (HCC): ICD-10-CM

## 2018-06-26 RX ORDER — TERBINAFINE HYDROCHLORIDE 250 MG/1
TABLET ORAL
COMMUNITY
Start: 2018-06-20 | End: 2019-06-27

## 2018-06-26 RX ORDER — ALLOPURINOL 100 MG/1
100 TABLET ORAL DAILY
Qty: 90 TAB | Refills: 5 | Status: SHIPPED | OUTPATIENT
Start: 2018-06-26 | End: 2019-08-25 | Stop reason: SDUPTHER

## 2018-06-26 RX ORDER — AA/PROT/LYSINE/METHIO/VIT C/B6 50-12.5 MG
TABLET ORAL
COMMUNITY

## 2018-06-26 NOTE — MR AVS SNAPSHOT
[address] [facility] 
 
 
 [address] [place] [address] [address] 
[phone] Patient: Naomi Martinez MRN: [id number] Visit Information Date & Time Provider Department Dept. Phone Encounter #  
 6/26/2018 11:00 AM MD [facility]y [facility] [id number] Follow-up Instructions Return in about 1 month (around 7/26/2018) for pre-op recheck. Your Appointments 7/2/2018 12:00 PM  
Office Visit with Garcia Layne MD  
[facility] Appt Note: pre-op clearance for right shoulder rotator cuff, surgeon: José Miguel Vital, Ph# [phone]  
 [address]  
  
   
 [address] [facility] Upcoming Health Maintenance Date Due  
 MEDICARE YEARLY EXAM 6/7/2018 Pneumococcal 65+ Low/Medium Risk (2 of 2 - PCV13) 9/24/2018* Influenza Age 5 to Adult 8/1/2018 BREAST CANCER SCRN MAMMOGRAM 8/17/2019 GLAUCOMA SCREENING Q2Y 4/10/2020 COLONOSCOPY 7/10/2020 DTaP/Tdap/Td series (2 - Td) 10/11/2020 *Topic was postponed. The date shown is not the original due date. Allergies as of 6/26/2018  Review Complete On: 6/26/2018 By: Garcia Layne MD  
  
 Severity Noted Reaction Type Reactions Erythromycin  10/22/2013    Swelling Statins-hmg-coa Reductase Inhibitors  03/26/2018    Myalgia Current Immunizations  Reviewed on 6/25/2018 Name Date Influenza High Dose Vaccine PF 10/17/2017 Pneumococcal Polysaccharide (PPSV-23) 6/6/2017  9:35 AM  
 TDAP Vaccine 10/11/2010 Zoster Vaccine, Live 12/12/2014 Not reviewed this visit You Were Diagnosed With   
  
 Codes Comments Medicare annual wellness visit, subsequent    -  Primary ICD-10-CM: Z00.00 ICD-9-CM: V70.0 Essential hypertension     ICD-10-CM: I10 
ICD-9-CM: 401.9 Hypothyroidism due to acquired atrophy of thyroid     ICD-10-CM: E03.4 ICD-9-CM: 244.8, 246.8 Obesity, morbid ([facility]     ICD-10-CM: E66.01 
ICD-9-CM: 278.01 Vitals BP Pulse Temp Resp Height(growth percentile) Weight(growth percentile) 124/76 (!) 57 97.9 °F (36.6 °C) (Oral) 16 5' 8\" (1.727 m) 277 lb (125.6 kg) SpO2 BMI OB Status Smoking Status 98% 42.12 kg/m2 Hysterectomy Never Smoker Vitals History BMI and BSA Data Body Mass Index Body Surface Area  
 42.12 kg/m 2 2.45 m 2 Preferred Pharmacy Pharmacy Name Phone Isidro Strong, [facility] [phone] Your Updated Medication List  
  
   
This list is accurate as of 6/26/18 11:52 AM.  Always use your most recent med list.  
  
  
  
  
 allopurinol 100 mg tablet Commonly known as:  Chuyita Fulling Take 1 Tab by mouth daily. amLODIPine 5 mg tablet Commonly known as:  Bijal Pall TAKE 1 TABLET ONCE DAILY FOR HYPERTENSION  
  
 CO Q-10 10 mg Cap Generic drug:  coenzyme q10 Take  by mouth. diclofenac EC 50 mg EC tablet Commonly known as:  VOLTAREN Take 1 Tab by mouth two (2) times a day. Indications: OSTEOARTHRITIS  
  
 levothyroxine 100 mcg tablet Commonly known as:  SYNTHROID  
TAKE 2 TABLETS BEFORE BREAKFAST ON SUNDAY &  WEDNESDAY AND 1 TABLET BEFORE BREAKFAST ON ALL OTHER DAYS. MOTRIN 800 mg tablet Generic drug:  ibuprofen Take 800 mg by mouth every eight (8) hours as needed for Pain.  
  
 pravastatin 40 mg tablet Commonly known as:  PRAVACHOL Take 1 Tab by mouth nightly. Indications: hypercholesterolemia  
  
 terbinafine HCl 250 mg tablet Commonly known as:  LAMISIL  
  
 TOPROL XL 25 mg XL tablet Generic drug:  metoprolol succinate TAKE 1 TABLET DAILY FOR HYPERTENSION  
  
 traMADol 50 mg tablet Commonly known as:  ULTRAM  
Take 1 Tab by mouth every six (6) hours as needed for Pain. triamterene-hydroCHLOROthiazide 75-50 mg per tablet Commonly known as:  Crisn Spatz Take 1 Tab by mouth daily as needed. Indications: Edema Follow-up Instructions Return in about 1 month (around 7/26/2018) for pre-op recheck. Patient Instructions Medicare Wellness Visit, Female The best way to live healthy is to have a lifestyle where you eat a well-balanced diet, exercise regularly, limit alcohol use, and quit all forms of tobacco/nicotine, if applicable. Regular preventive services are another way to keep healthy. Preventive services (vaccines, screening tests, monitoring & exams) can help personalize your care plan, which helps you manage your own care. Screening tests can find health problems at the earliest stages, when they are easiest to treat. [facility] follows the current, evidence-based guidelines published by the [facility] Florin Izquierdo ([facility]) when recommending preventive services for our patients. Because we follow these guidelines, sometimes recommendations change over time as research supports it. (For example, mammograms used to be recommended annually. Even though Medicare will still pay for an annual mammogram, the newer guidelines recommend a mammogram every two years for women of average risk.) Of course, you and your provider may decide to screen more often for some diseases, based on your risk and co-morbidities (chronic disease you are already diagnosed with). Preventive services for you include: - Medicare offers their members a free annual wellness visit, which is time for you and your primary care provider to discuss and plan for your preventive service needs. Take advantage of this benefit every year! 
 
-All people over age 72 should receive the recommended pneumonia vaccines. Current USPSTF guidelines recommend a series of two vaccines for the best pneumonia protection. -All adults should have a yearly flu vaccine and a tetanus vaccine every 10 years. All adults age 61 years should receive a shingles vaccine once in their lifetime.   
 
-A bone mass density test is recommended when a woman turns 65 to screen for osteoporosis. This test is only recommended once as a screening. Some providers will use this same test as a disease monitoring tool if you already have osteoporosis. -All adults age 38-68 years who are overweight should have a diabetes screening test once every three years.  
 
-Other screening tests & preventive services for persons with diabetes include: an eye exam to screen for diabetic retinopathy, a kidney function test, a foot exam, and stricter control over your cholesterol.  
 
-Cardiovascular screening for adults with routine risk involves an electrocardiogram (ECG) at intervals determined by the provider.  
 
-Colorectal cancer screenings should be done for adults age 54-65 years with normal risk. There are a number of acceptable methods of screening for this type of cancer. Each test has its own benefits and drawbacks. Discuss with your provider what is most appropriate for you during your annual wellness visit. The different tests include: colonoscopy (considered the best screening method), a fecal occult blood test, a fecal DNA test, and sigmoidoscopy. -Breast cancer screenings are recommended every other year for women of normal risk age 54-69 years.  
 
-Cervical cancer screenings for women over age 72 are only recommended with certain risk factors.  
 
-All adults born between [facility] should be screened once for Hepatitis C. Here is a list of your current Health Maintenance items (your personalized list of preventive services) with a due date: 
Health Maintenance Due Topic Date Due  
 Annual Well Visit  06/07/2018 Introducing [facility] SERVICES! Dear Anatoly Pedersen: 
Thank you for requesting a [email or web address] account.   Our records indicate that you already have an active [employer] account. You can access your account anytime at [email or web address] Did you know that you can access your hospital and ER discharge instructions at any time in [employer]? You can also review all of your test results from your hospital stay or ER visit. Additional Information If you have questions, please visit the Frequently Asked Questions section of the [employer] website at [email or web address] Remember, [employer] is NOT to be used for urgent needs. For medical emergencies, dial 911. Now available from your iPhone and Android! Please provide this summary of care documentation to your next provider. Your primary care clinician is listed as [facility]. If you have any questions after today's visit, please call [phone].

## 2018-06-26 NOTE — PROGRESS NOTES
Identified pt with two pt identifiers(name and ). Reviewed record in preparation for visit and have obtained necessary documentation. Chief Complaint   Patient presents with    Annual Wellness Visit     (Rm#5)    Hypertension     3mo f/u; pt had CBC, BMP, EKG done 18 for upcoming shoulder surgery; testing done @ [facility] outpaitent lab    Cholesterol Problem        Health Maintenance Due   Topic    Pneumococcal 65+ Low/Medium Risk (2 of 2 - PCV13)    MEDICARE YEARLY EXAM        Coordination of Care Questionnaire:  :   1) Have you been to an emergency room, urgent care clinic since your last visit? no   Hospitalized since your last visit? no             2. Have seen or consulted any other health care provider since your last visit? YES  If yes, where when, and reason for visit?    - Dr. Wilian Wells (Ortho)- pt has upcoming R shoulder surgery 2108  - Dr. Charbel Lujan (podiatrist)  - 2018: Dr. Parmjit Ramirez (ophthalm)    3) Do you have an Advanced Directive/ Living Will in place? NO  If yes, do we have a copy on file NO  If no, would you like information NO    Patient is accompanied by self I have received verbal consent from Shaheen Ortiz to discuss any/all medical information while they are present in the room.

## 2018-06-26 NOTE — PROGRESS NOTES
This is the Subsequent Medicare Annual Wellness Exam, performed 12 months or more after the Initial AWV or the last Subsequent AWV    I have reviewed the patient's medical history in detail and updated the computerized patient record. History     Past Medical History:   Diagnosis Date    Colon polyps 07/10/2017    hyperplastic    Delivery normal [id number]    DJD (degenerative joint disease) of knee     hiatal hernia     Immunization, viral disease unk    hepatitis B series    Thyroid disease       Past Surgical History:   Procedure Laterality Date    ENDOSCOPY, COLON, DIAGNOSTIC  2007    HAND/FINGER SURGERY UNLISTED  2008    trigger finger    HX COLONOSCOPY  07/10/2017    HX CYSTOCELE REPAIR      HX HYSTERECTOMY  1983    partial    HX KNEE ARTHROSCOPY Left 9/21/15    Dr. Syeda Dc    HX TONSIL AND ADENOIDECTOMY      NASAL SURG PROC UNLISTED      polyps    REMOVAL GALLBLADDER      REPAIR OF RECTOCELE      REV LOWER EYELID EXTEN FAT PAD  9/18/2013     Current Outpatient Prescriptions   Medication Sig Dispense Refill    coenzyme q10 (CO Q-10) 10 mg cap Take  by mouth.  allopurinol (ZYLOPRIM) 100 mg tablet Take 1 Tab by mouth daily. 90 Tab 5    amLODIPine (NORVASC) 5 mg tablet TAKE 1 TABLET ONCE DAILY FOR HYPERTENSION 90 Tab 5    TOPROL XL 25 mg XL tablet TAKE 1 TABLET DAILY FOR HYPERTENSION 90 Tab 5    levothyroxine (SYNTHROID) 100 mcg tablet TAKE 2 TABLETS BEFORE BREAKFAST ON SUNDAY &  WEDNESDAY AND 1 TABLET BEFORE BREAKFAST ON ALL OTHER DAYS. 108 Tab 5    diclofenac EC (VOLTAREN) 50 mg EC tablet Take 1 Tab by mouth two (2) times a day. Indications: OSTEOARTHRITIS 180 Tab 4    traMADol (ULTRAM) 50 mg tablet Take 1 Tab by mouth every six (6) hours as needed for Pain. 90 Tab 5    terbinafine HCl (LAMISIL) 250 mg tablet       pravastatin (PRAVACHOL) 40 mg tablet Take 1 Tab by mouth nightly.  Indications: hypercholesterolemia (Patient not taking: Reported on 6/26/2018) 30 Tab 5    triamterene-hydroCHLOROthiazide (MAXZIDE) 75-50 mg per tablet Take 1 Tab by mouth daily as needed. Indications: Edema 90 Tab 5    ibuprofen (MOTRIN) 800 mg tablet Take 800 mg by mouth every eight (8) hours as needed for Pain. Allergies   Allergen Reactions    Erythromycin Swelling    Statins-Hmg-Coa Reductase Inhibitors Myalgia     Family History   Problem Relation Age of Onset    Hypertension Mother     Diabetes Mother     Arthritis-osteo Mother     Thyroid Disease Sister     Thyroid Disease Brother      Social History   Substance Use Topics    Smoking status: Never Smoker    Smokeless tobacco: Never Used    Alcohol use No     Patient Active Problem List   Diagnosis Code    Hypothyroidism due to acquired atrophy of thyroid E03.4    Essential hypertension I10    Obesity, morbid ([facility] [address] E66.01       Depression Risk Factor Screening:     PHQ over the last two weeks 6/26/2018   Little interest or pleasure in doing things Not at all   Feeling down, depressed or hopeless Not at all   Total Score PHQ 2 0     Alcohol Risk Factor Screening: You do not drink alcohol or very rarely. Functional Ability and Level of Safety:   Hearing Loss  Hearing is good. Activities of Daily Living  The home contains: [employer]  Patient does total self care    Fall Risk  Fall Risk Assessment, last 12 mths 6/26/2018   Able to walk? Yes   Fall in past 12 months?  No       Abuse Screen  Patient is not abused    Cognitive Screening   Evaluation of Cognitive Function:  Has your family/caregiver stated any concerns about your memory: no  Normal, Mini Cog test    Patient Care Team   Patient Care Team:  Gregorio Condon MD as PCP - General (Internal Medicine)  Lebron Montgomery MD as Consulting Provider (Ophthalmology)  Ervin Quick MD as Consulting Provider (Orthopedic Surgery)    Assessment/Plan   Education and counseling provided:  Are appropriate based on today's review and evaluation    Diagnoses and all orders for this visit:    1. Medicare annual wellness visit, subsequent    2. Essential hypertension    3. Hypothyroidism due to acquired atrophy of thyroid    4. Obesity, morbid ([address]    5. Medication refill  -     allopurinol (ZYLOPRIM) 100 mg tablet; Take 1 Tab by mouth daily.         Health Maintenance Due   Topic Date Due    MEDICARE YEARLY EXAM  06/07/2018

## 2018-06-26 NOTE — PROGRESS NOTES
HISTORY OF PRESENT ILLNESS  Rosa Batres is a 78 y.o. female. Visit Vitals    /76    Pulse (!) 57    Temp 97.9 °F (36.6 °C) (Oral)    Resp 16    Ht 5' 8\" (1.727 m)    Wt 277 lb (125.6 kg)    SpO2 98%    BMI 42.12 kg/m2       Hypertension    The history is provided by the patient. This is a chronic problem. The current episode started more than 1 week ago. There are no associated agents to hypertension. Risk factors include postmenopause and obesity. Cholesterol Problem   The history is provided by the patient. This is a chronic problem. The current episode started more than 1 week ago. The problem occurs daily. The problem has not changed since onset. Exacerbated by: diet. The symptoms are relieved by medications (diet). Thyroid Problem   The history is provided by the patient. This is a chronic problem. The current episode started more than 1 week ago. The problem occurs daily. The problem has not changed since onset. The symptoms are relieved by medications. Review of Systems   Constitutional: Negative. HENT: Negative. Respiratory: Negative. Cardiovascular: Negative. Gastrointestinal: Negative. Musculoskeletal: Negative. Neurological: Negative. Physical Exam   Constitutional: She is oriented to person, place, and time. She appears well-developed and well-nourished. No distress. Cardiovascular: Normal rate and regular rhythm. Pulmonary/Chest: Effort normal and breath sounds normal.   Musculoskeletal: She exhibits no edema. Neurological: She is alert and oriented to person, place, and time. Skin: Skin is warm and dry. She is not diaphoretic. Psychiatric: She has a normal mood and affect. Nursing note and vitals reviewed. ASSESSMENT and PLAN    ICD-10-CM ICD-9-CM           2. Essential hypertension I10 401.9    3. Hypothyroidism due to acquired atrophy of thyroid E03.4 244.8      246.8    4.  Obesity, morbid ([facility] [address] E66.01 278.01      90 d refill allopurinol sent to [facility]    BP controlled    Discussed BMI/weight, lifestyle, diet and exercise. Discussed effect on blood pressure, blood sugar, and joints especially  Focus on limiting white carbs, portion control, and moving more. Weight is actually up    Lab done recently for us in April and CBC and BMP were done last Friday at [facility].     F/u one month to finalize a pre-op exam

## 2018-06-26 NOTE — PATIENT INSTRUCTIONS
Medicare Wellness Visit, Female    The best way to live healthy is to have a lifestyle where you eat a well-balanced diet, exercise regularly, limit alcohol use, and quit all forms of tobacco/nicotine, if applicable. Regular preventive services are another way to keep healthy. Preventive services (vaccines, screening tests, monitoring & exams) can help personalize your care plan, which helps you manage your own care. Screening tests can find health problems at the earliest stages, when they are easiest to treat. [facility] follows the current, evidence-based guidelines published by the [facility] Florin Timbo ([facility]) when recommending preventive services for our patients. Because we follow these guidelines, sometimes recommendations change over time as research supports it. (For example, mammograms used to be recommended annually. Even though Medicare will still pay for an annual mammogram, the newer guidelines recommend a mammogram every two years for women of average risk.)    Of course, you and your provider may decide to screen more often for some diseases, based on your risk and co-morbidities (chronic disease you are already diagnosed with). Preventive services for you include:    - Medicare offers their members a free annual wellness visit, which is time for you and your primary care provider to discuss and plan for your preventive service needs. Take advantage of this benefit every year!    -All people over age 72 should receive the recommended pneumonia vaccines. Current USPSTF guidelines recommend a series of two vaccines for the best pneumonia protection.     -All adults should have a yearly flu vaccine and a tetanus vaccine every 10 years. All adults age 61 years should receive a shingles vaccine once in their lifetime.      -A bone mass density test is recommended when a woman turns 65 to screen for osteoporosis.  This test is only recommended once as a screening. Some providers will use this same test as a disease monitoring tool if you already have osteoporosis. -All adults age 38-68 years who are overweight should have a diabetes screening test once every three years.     -Other screening tests & preventive services for persons with diabetes include: an eye exam to screen for diabetic retinopathy, a kidney function test, a foot exam, and stricter control over your cholesterol.     -Cardiovascular screening for adults with routine risk involves an electrocardiogram (ECG) at intervals determined by the provider.     -Colorectal cancer screenings should be done for adults age 54-65 years with normal risk. There are a number of acceptable methods of screening for this type of cancer. Each test has its own benefits and drawbacks. Discuss with your provider what is most appropriate for you during your annual wellness visit. The different tests include: colonoscopy (considered the best screening method), a fecal occult blood test, a fecal DNA test, and sigmoidoscopy. -Breast cancer screenings are recommended every other year for women of normal risk age 54-69 years.     -Cervical cancer screenings for women over age 72 are only recommended with certain risk factors.     -All adults born between [facility] should be screened once for Hepatitis C.      Here is a list of your current Health Maintenance items (your personalized list of preventive services) with a due date:  Health Maintenance Due   Topic Date Due    Annual Well Visit  06/07/2018

## 2018-07-26 ENCOUNTER — TELEPHONE (OUTPATIENT)
Dept: INTERNAL MEDICINE CLINIC | Age: 68
End: 2018-07-26

## 2018-07-26 ENCOUNTER — OFFICE VISIT (OUTPATIENT)
Dept: INTERNAL MEDICINE CLINIC | Age: 68
End: 2018-07-26

## 2018-07-26 VITALS
WEIGHT: 278.2 LBS | RESPIRATION RATE: 18 BRPM | DIASTOLIC BLOOD PRESSURE: 78 MMHG | SYSTOLIC BLOOD PRESSURE: 136 MMHG | TEMPERATURE: 97.8 F | OXYGEN SATURATION: 98 % | HEIGHT: 68 IN | BODY MASS INDEX: 42.16 KG/M2 | HEART RATE: 57 BPM

## 2018-07-26 DIAGNOSIS — G89.29 CHRONIC RIGHT SHOULDER PAIN: ICD-10-CM

## 2018-07-26 DIAGNOSIS — E03.4 HYPOTHYROIDISM DUE TO ACQUIRED ATROPHY OF THYROID: Chronic | ICD-10-CM

## 2018-07-26 DIAGNOSIS — I10 ESSENTIAL HYPERTENSION: Chronic | ICD-10-CM

## 2018-07-26 DIAGNOSIS — Z01.818 PRE-OP EXAM: Primary | ICD-10-CM

## 2018-07-26 DIAGNOSIS — M25.511 CHRONIC RIGHT SHOULDER PAIN: ICD-10-CM

## 2018-07-26 RX ORDER — NYSTATIN AND TRIAMCINOLONE ACETONIDE 100000; 1 [USP'U]/G; MG/G
OINTMENT TOPICAL 2 TIMES DAILY
Qty: 30 G | Refills: 5 | Status: SHIPPED | OUTPATIENT
Start: 2018-07-26 | End: 2019-02-26

## 2018-07-26 NOTE — TELEPHONE ENCOUNTER
Attempted to contact pt, no answer; left voicemail stating that Rx was sent to pharmacy. For any further questions or concerns can give call back to office at [phone].

## 2018-07-26 NOTE — PROGRESS NOTES
ROOM # 6  Pt would like Rx for nystatin with triamcinolone cream for under breasts. Brody Galo presents today for   Chief Complaint   Patient presents with    Pre-op Exam     graft in R shoulder per pt       Brody Galo preferred language for health care discussion is english/other. Is someone accompanying this pt? no    Is the patient using any DME equipment during OV? no    Depression Screening:  PHQ over the last two weeks 6/26/2018 3/26/2018 9/12/2017 6/6/2017 2/2/2017 9/12/2016 6/7/2016   Little interest or pleasure in doing things Not at all Not at all Not at all Not at all Not at all Not at all Not at all   Feeling down, depressed, irritable, or hopeless Not at all Not at all Not at all Not at all Not at all Not at all Not at all   Total Score PHQ 2 0 0 0 0 0 0 0       Learning Assessment:  Learning Assessment 6/24/2015   PRIMARY LEARNER Patient   HIGHEST LEVEL OF EDUCATION - PRIMARY LEARNER  [id number] [address] PRIMARY LEARNER NONE   PRIMARY LANGUAGE ENGLISH   LEARNER PREFERENCE PRIMARY DEMONSTRATION   ANSWERED BY Patient   RELATIONSHIP SELF       Abuse Screening:  Abuse Screening Questionnaire 6/26/2018 6/24/2015   Do you ever feel afraid of your partner? N N   Are you in a relationship with someone who physically or mentally threatens you? N N   Is it safe for you to go home? Y Y       Fall Risk  Fall Risk Assessment, last 12 mths 7/26/2018 6/26/2018 3/26/2018 9/12/2017 6/6/2017 2/2/2017   Able to walk? Yes Yes Yes Yes Yes Yes   Fall in past 12 months? No No No No No No       Health Maintenance reviewed and discussed per provider. Yes    Brody Galo is due for There are no preventive care reminders to display for this patient. Please order/place referral if appropriate. Advance Directive:  1. Do you have an advance directive in place? Patient Reply: no    2. If not, would you like material regarding how to put one in place? Patient Reply: no    Coordination of Care:  1. Have you been to the ER, urgent care clinic since your last visit? Hospitalized since your last visit? no    2. Have you seen or consulted any other health care providers outside of the [address] [place] [address] since your last visit? Include any pap smears or colon screening.  no

## 2018-07-26 NOTE — PROGRESS NOTES
HISTORY OF PRESENT ILLNESS  Liz Swanson is a 78 y.o. female. Visit Vitals    /78 (BP 1 Location: Right arm, BP Patient Position: Sitting)    Pulse (!) 57    Temp 97.8 °F (36.6 °C) (Oral)    Resp 18    Ht 5' 8\" (1.727 m)    Wt 278 lb 3.2 oz (126.2 kg)    SpO2 98%    BMI 42.3 kg/m2       HPI Comments: Pt to have upcoming right shoulder surgery August the 9th. [address] of graft shoulder with graft    She was told the recent lab and EKG were OK form 6/22/18    She can easily walk at least a half mile and go up a flight of stairs w/o problem. She can do heavy housework w/o problem    Has no prior adverse anesthesia reactions. Pre-op Exam   The history is provided by the patient. This is a new problem. Review of Systems   Constitutional: Negative. Negative for diaphoresis. HENT: Negative for sore throat. Eyes: Negative for discharge and redness. Respiratory: Negative. Cardiovascular: Negative. Gastrointestinal: Negative for diarrhea and vomiting. Neurological: Negative. Physical Exam   Constitutional: She is oriented to person, place, and time. She appears well-developed and well-nourished. No distress. HENT:   Right Ear: Tympanic membrane, external ear and ear canal normal.   Left Ear: Tympanic membrane, external ear and ear canal normal.   Mouth/Throat: Uvula is midline, oropharynx is clear and moist and mucous membranes are normal.   Eyes: Conjunctivae and EOM are normal.   Cardiovascular: Normal rate, regular rhythm and normal heart sounds. Pulmonary/Chest: Effort normal and breath sounds normal. No respiratory distress. She has no wheezes. She has no rales. Musculoskeletal: She exhibits no edema. Lymphadenopathy:     She has no cervical adenopathy. Neurological: She is alert and oriented to person, place, and time. Skin: Skin is warm and dry. She is not diaphoretic. Psychiatric: She has a normal mood and affect.    Nursing note and vitals reviewed. ASSESSMENT and PLAN    ICD-10-CM ICD-9-CM    1. Pre-op exam Z01.818 V72.84    2. Essential hypertension I10 401.9    3. Hypothyroidism due to acquired atrophy of thyroid E03.4 244.8      246.8    4. Chronic right shoulder pain M25.511 719.41     G89.29 338.29          Doing well overall. Reviewed most recent testing    No contraindication for planned surgery.  Cleared for current surgery    F/u prn or as appointed

## 2018-07-26 NOTE — MR AVS SNAPSHOT
[address] [place] [address] 
 
 
 [address] [facility] [address] [address] 
[phone] Patient: Liz Swanson MRN: [id number] ZIB:[id number] Visit Information Date & Time Provider Department Dept. Phone Encounter #  
 7/26/2018  1:30 PM Alyssa Vieyra, [address] [facility] includes the [facility] [id number] Follow-up Instructions Return if symptoms worsen or fail to improve, for or as appointed. Upcoming Health Maintenance Date Due Pneumococcal 65+ Low/Medium Risk (2 of 2 - PCV13) 9/24/2018* Influenza Age 5 to Adult 8/1/2018 MEDICARE YEARLY EXAM 6/27/2019 BREAST CANCER SCRN MAMMOGRAM 8/17/2019 GLAUCOMA SCREENING Q2Y 4/10/2020 COLONOSCOPY 7/10/2020 DTaP/Tdap/Td series (2 - Td) 10/11/2020 *Topic was postponed. The date shown is not the original due date. Allergies as of 7/26/2018  Review Complete On: 7/26/2018 By: Alyssa Vieyra MD  
  
 Severity Noted Reaction Type Reactions Erythromycin  10/22/2013    Swelling Statins-hmg-coa Reductase Inhibitors  03/26/2018    Myalgia Current Immunizations  Reviewed on 6/25/2018 Name Date Influenza High Dose Vaccine PF 10/17/2017 Pneumococcal Polysaccharide (PPSV-23) 6/6/2017  9:35 AM  
 TDAP Vaccine 10/11/2010 Zoster Vaccine, Live 12/12/2014 Not reviewed this visit You Were Diagnosed With   
  
 Codes Comments Pre-op exam    -  Primary ICD-10-CM: Y22.271 ICD-9-CM: V72.84 Essential hypertension     ICD-10-CM: I10 
ICD-9-CM: 401.9 Hypothyroidism due to acquired atrophy of thyroid     ICD-10-CM: E03.4 ICD-9-CM: 244.8, 246.8 Chronic right shoulder pain     ICD-10-CM: M25.511, G89.29 ICD-9-CM: 719.41, 338.29 Vitals BP Pulse Temp Resp Height(growth percentile) Weight(growth percentile) 136/78 (BP 1 Location: Right arm, BP Patient Position: Sitting) (!) 57 97.8 °F (36.6 °C) (Oral) 18 5' 8\" (1.727 m) 278 lb 3.2 oz (126.2 kg) SpO2 BMI OB Status Smoking Status 98% 42.3 kg/m2 Hysterectomy Never Smoker Vitals History BMI and BSA Data Body Mass Index Body Surface Area  
 42.3 kg/m 2 2.46 m 2 Preferred Pharmacy Pharmacy Name Phone Isidro Strong, [facility] [phone] Your Updated Medication List  
  
   
This list is accurate as of 7/26/18  2:32 PM.  Always use your most recent med list.  
  
  
  
  
 allopurinol 100 mg tablet Commonly known as:  Sommer Salinas Take 1 Tab by mouth daily. amLODIPine 5 mg tablet Commonly known as:  [place] TAKE 1 TABLET ONCE DAILY FOR HYPERTENSION  
  
 CO Q-10 10 mg Cap Generic drug:  coenzyme q10 Take  by mouth. diclofenac EC 50 mg EC tablet Commonly known as:  VOLTAREN Take 1 Tab by mouth two (2) times a day. Indications: OSTEOARTHRITIS  
  
 levothyroxine 100 mcg tablet Commonly known as:  SYNTHROID  
TAKE 2 TABLETS BEFORE BREAKFAST ON SUNDAY &  WEDNESDAY AND 1 TABLET BEFORE BREAKFAST ON ALL OTHER DAYS. MOTRIN 800 mg tablet Generic drug:  ibuprofen Take 800 mg by mouth every eight (8) hours as needed for Pain.  
  
 pravastatin 40 mg tablet Commonly known as:  PRAVACHOL Take 1 Tab by mouth nightly. Indications: hypercholesterolemia  
  
 terbinafine HCl 250 mg tablet Commonly known as:  LAMISIL  
  
 TOPROL XL 25 mg XL tablet Generic drug:  metoprolol succinate TAKE 1 TABLET DAILY FOR HYPERTENSION  
  
 traMADol 50 mg tablet Commonly known as:  ULTRAM  
Take 1 Tab by mouth every six (6) hours as needed for Pain.  
  
 triamterene-hydroCHLOROthiazide 75-50 mg per tablet Commonly known as:  [place] Take 1 Tab by mouth daily as needed. Indications: Edema Follow-up Instructions Return if symptoms worsen or fail to improve, for or as appointed. Introducing [facility]! Dear Michell Costa: Thank you for requesting a [employer] account. Our records indicate that you already have an active [employer] account. You can access your account anytime at [email or web address] Did you know that you can access your hospital and ER discharge instructions at any time in [employer]? You can also review all of your test results from your hospital stay or ER visit. Additional Information If you have questions, please visit the Frequently Asked Questions section of the [employer] website at [email or web address] Remember, [employer] is NOT to be used for urgent needs. For medical emergencies, dial 911. Now available from your iPhone and Android! Please provide this summary of care documentation to your next provider. Your primary care clinician is listed as [facility]. If you have any questions after today's visit, please call [phone].

## 2018-07-26 NOTE — LETTER
Facsimile Cover Letter 7/26/2018 [facility] 
[address] [id number] 
[phone] To:   
Phone:  
Fax:  
 
 
From:   
Phone:   
Fax: CONFIDENTIALITY NOTICE The document accompanying this telecopy transmission contain confidential information, belonging to the sender, which is legally privileged. This information is intended only for the use of the individual or entity named above. The authorized recipient of this information is prohibited from disclosing this information to any other party and is required to destroy the information after its stated need has been fulfilled, unless otherwise required by state law. If you are not the intended recipient, you are hereby notified that any disclosure, copying, distribution, or action taken in reliance on the contents of these documents is strictly prohibited. If you have received this telecopy in error, please notify the sender immediately to arrange for return of these documents.

## 2018-09-27 DIAGNOSIS — M19.91 PRIMARY OSTEOARTHRITIS, UNSPECIFIED SITE: ICD-10-CM

## 2018-09-27 RX ORDER — DICLOFENAC SODIUM 50 MG/1
TABLET, DELAYED RELEASE ORAL
Qty: 180 TAB | Refills: 4 | Status: SHIPPED | OUTPATIENT
Start: 2018-09-27 | End: 2019-12-22

## 2018-10-26 ENCOUNTER — OFFICE VISIT (OUTPATIENT)
Dept: INTERNAL MEDICINE CLINIC | Age: 68
End: 2018-10-26

## 2018-10-26 ENCOUNTER — HOSPITAL ENCOUNTER (OUTPATIENT)
Dept: LAB | Age: 68
Discharge: HOME | End: 2018-10-26
Payer: MEDICARE

## 2018-10-26 VITALS
SYSTOLIC BLOOD PRESSURE: 139 MMHG | HEART RATE: 56 BPM | RESPIRATION RATE: 18 BRPM | OXYGEN SATURATION: 99 % | HEIGHT: 68 IN | WEIGHT: 282 LBS | TEMPERATURE: 97.1 F | BODY MASS INDEX: 42.74 KG/M2 | DIASTOLIC BLOOD PRESSURE: 72 MMHG

## 2018-10-26 DIAGNOSIS — I10 ESSENTIAL HYPERTENSION: ICD-10-CM

## 2018-10-26 DIAGNOSIS — E03.4 HYPOTHYROIDISM DUE TO ACQUIRED ATROPHY OF THYROID: ICD-10-CM

## 2018-10-26 DIAGNOSIS — Z23 ENCOUNTER FOR IMMUNIZATION: ICD-10-CM

## 2018-10-26 DIAGNOSIS — E66.01 OBESITY, MORBID (HCC): ICD-10-CM

## 2018-10-26 DIAGNOSIS — I10 ESSENTIAL HYPERTENSION: Primary | ICD-10-CM

## 2018-10-26 LAB
ANION GAP SERPL CALC-SCNC: 4 MMOL/L (ref 3–18)
BUN SERPL-MCNC: 26 MG/DL (ref 7–18)
BUN/CREAT SERPL: 19 (ref 12–20)
CALCIUM SERPL-MCNC: 8.8 MG/DL (ref 8.5–10.1)
CHLORIDE SERPL-SCNC: 109 MMOL/L (ref 100–108)
CHOLEST SERPL-MCNC: 238 MG/DL
CO2 SERPL-SCNC: 29 MMOL/L (ref 21–32)
CREAT SERPL-MCNC: 1.38 MG/DL (ref 0.6–1.3)
GLUCOSE SERPL-MCNC: 105 MG/DL (ref 74–99)
HDLC SERPL-MCNC: 50 MG/DL (ref 40–60)
HDLC SERPL: 4.8 {RATIO} (ref 0–5)
LDLC SERPL CALC-MCNC: 158.8 MG/DL (ref 0–100)
LIPID PROFILE,FLP: ABNORMAL
POTASSIUM SERPL-SCNC: 4.8 MMOL/L (ref 3.5–5.5)
SODIUM SERPL-SCNC: 142 MMOL/L (ref 136–145)
T4 FREE SERPL-MCNC: 1.3 NG/DL (ref 0.7–1.5)
TRIGL SERPL-MCNC: 146 MG/DL (ref ?–150)
TSH SERPL DL<=0.05 MIU/L-ACNC: 6.7 UIU/ML (ref 0.36–3.74)
VLDLC SERPL CALC-MCNC: 29.2 MG/DL

## 2018-10-26 PROCEDURE — 84439 ASSAY OF FREE THYROXINE: CPT | Performed by: INTERNAL MEDICINE

## 2018-10-26 PROCEDURE — 80061 LIPID PANEL: CPT | Performed by: INTERNAL MEDICINE

## 2018-10-26 PROCEDURE — 80048 BASIC METABOLIC PNL TOTAL CA: CPT | Performed by: INTERNAL MEDICINE

## 2018-10-26 RX ORDER — HYDROCODONE BITARTRATE AND ACETAMINOPHEN 7.5; 325 MG/1; MG/1
TABLET ORAL
COMMUNITY
Start: 2018-09-11 | End: 2019-06-27

## 2018-10-26 RX ORDER — ASPIRIN 81 MG/1
81 TABLET ORAL
COMMUNITY

## 2018-10-26 RX ORDER — HYDROCODONE BITARTRATE AND ACETAMINOPHEN 10; 325 MG/1; MG/1
TABLET ORAL
Refills: 0 | COMMUNITY
Start: 2018-08-13 | End: 2019-06-27

## 2018-10-26 RX ORDER — RANITIDINE 150 MG/1
150 TABLET, FILM COATED ORAL
COMMUNITY
End: 2019-06-27

## 2018-10-26 NOTE — PROGRESS NOTES
HISTORY OF PRESENT ILLNESS Tita Devries is a 78 y.o. female. Visit Vitals /72 (BP 1 Location: Right arm, BP Patient Position: Sitting) Pulse (!) 56 Temp 97.1 °F (36.2 °C) (Oral) Resp 18 Ht 5' 8\" (1.727 m) Wt 282 lb (127.9 kg) SpO2 99% BMI 42.88 kg/m² Since last visit has had right shoulder surgery. 5 hour surgery . Graft placed and he found a torn biceps. So she has been staying home for 2 1/2 months Hypertension The history is provided by the patient. This is a chronic problem. The current episode started more than 1 week ago. The problem has not changed since onset. There are no associated agents to hypertension. Risk factors include obesity, postmenopause and hypertension. Thyroid Problem The history is provided by the patient. This is a chronic problem. The current episode started more than 1 week ago. The problem occurs daily. The symptoms are relieved by medications. Treatments tried: synthroid. The treatment provided significant relief. Review of Systems Constitutional: Negative. Respiratory: Negative. Cardiovascular: Negative. Neurological: Negative. Physical Exam  
Constitutional: She is oriented to person, place, and time. She appears well-developed and well-nourished. No distress. Cardiovascular: Normal rate and regular rhythm. Pulmonary/Chest: Effort normal and breath sounds normal.  
Musculoskeletal: She exhibits no edema. Neurological: She is alert and oriented to person, place, and time. Skin: Skin is warm and dry. She is not diaphoretic. Psychiatric: She has a normal mood and affect. Nursing note and vitals reviewed. ASSESSMENT and PLAN 
  ICD-10-CM ICD-9-CM 1. Essential hypertension M11 853.0 METABOLIC PANEL, BASIC  
   LIPID PANEL 2. Hypothyroidism due to acquired atrophy of thyroid E03.4 244.8 TSH AND FREE T4  
  246.8 3.  Obesity, morbid ([address] E66.01 278.01   
 4. Encounter for immunization Z23 V03.89 INFLUENZA VACCINE INACTIVATED (IIV), SUBUNIT, ADJUVANTED, IM  
   PNEUMOCOCCAL CONJ VACCINE 13 VALENT IM  
   ADMIN PNEUMOCOCCAL VACCINE  
 
 
BP controlled Weight up Discussed BMI/weight, lifestyle, diet and exercise. Discussed effect on blood pressure, blood sugar, and joints especially Focus on limiting white carbs, portion control, and moving more. Update lab today Vaccines as noted F/u 4 months

## 2018-10-26 NOTE — PROGRESS NOTES
ROOM # 4 Cris Marin presents today for Chief Complaint Patient presents with  Hypertension  
  follow up Cris Marin preferred language for health care discussion is english/other. Is someone accompanying this pt? no 
 
Is the patient using any DME equipment during OV? no 
 
Depression Screening: PHQ over the last two weeks 6/26/2018 3/26/2018 9/12/2017 6/6/2017 2/2/2017 9/12/2016 6/7/2016 Little interest or pleasure in doing things Not at all Not at all Not at all Not at all Not at all Not at all Not at all Feeling down, depressed, irritable, or hopeless Not at all Not at all Not at all Not at all Not at all Not at all Not at all Total Score PHQ 2 0 0 0 0 0 0 0 Learning Assessment: 
Learning Assessment 6/24/2015 PRIMARY LEARNER Patient HIGHEST LEVEL OF EDUCATION - PRIMARY LEARNER  [employer]  
BARRIERS PRIMARY LEARNER NONE PRIMARY LANGUAGE ENGLISH  
LEARNER PREFERENCE PRIMARY DEMONSTRATION  
ANSWERED BY Patient RELATIONSHIP SELF Abuse Screening: 
Abuse Screening Questionnaire 6/26/2018 6/24/2015 Do you ever feel afraid of your partner? Daphine Forget Are you in a relationship with someone who physically or mentally threatens you? Daphine Forget Is it safe for you to go home? Manoj Wilson Fall Risk Fall Risk Assessment, last 12 mths 10/26/2018 7/26/2018 6/26/2018 3/26/2018 9/12/2017 6/6/2017 2/2/2017 Able to walk? Yes Yes Yes Yes Yes Yes Yes Fall in past 12 months? No No No No No No No  
 
 
Health Maintenance reviewed and discussed per provider. Yes Cris Marin is due for Health Maintenance Due Topic Date Due  Shingrix Vaccine Age 50> (1 of 2) 12/10/2001  Pneumococcal 65+ Low/Medium Risk (2 of 2 - PCV13) 06/06/2018  Influenza Age 5 to Adult  08/01/2018 Please order/place referral if appropriate. Advance Directive: 1. Do you have an advance directive in place?  Patient Reply: no 
 
 2. If not, would you like material regarding how to put one in place? Patient Reply: no 
 
Coordination of Care: 1. Have you been to the ER, urgent care clinic since your last visit? Hospitalized since your last visit? no 
 
2. Have you seen or consulted any other health care providers outside of the [address] [place] [address] since your last visit? Include any pap smears or colon screening.  no

## 2018-10-28 RX ORDER — LEVOTHYROXINE SODIUM 100 UG/1
TABLET ORAL
Qty: 112 TAB | Refills: 5
Start: 2018-10-28 | End: 2019-02-26 | Stop reason: SDUPTHER

## 2019-02-26 ENCOUNTER — HOSPITAL ENCOUNTER (OUTPATIENT)
Dept: LAB | Age: 69
Discharge: HOME | End: 2019-02-26
Payer: MEDICARE

## 2019-02-26 ENCOUNTER — OFFICE VISIT (OUTPATIENT)
Dept: INTERNAL MEDICINE CLINIC | Age: 69
End: 2019-02-26

## 2019-02-26 VITALS
HEART RATE: 59 BPM | OXYGEN SATURATION: 100 % | SYSTOLIC BLOOD PRESSURE: 130 MMHG | BODY MASS INDEX: 41.8 KG/M2 | HEIGHT: 68 IN | DIASTOLIC BLOOD PRESSURE: 73 MMHG | TEMPERATURE: 95.7 F | RESPIRATION RATE: 12 BRPM | WEIGHT: 275.8 LBS

## 2019-02-26 DIAGNOSIS — I10 ESSENTIAL HYPERTENSION: Chronic | ICD-10-CM

## 2019-02-26 DIAGNOSIS — Z76.0 MEDICATION REFILL: ICD-10-CM

## 2019-02-26 DIAGNOSIS — E66.01 OBESITY, MORBID (HCC): ICD-10-CM

## 2019-02-26 DIAGNOSIS — E03.4 HYPOTHYROIDISM DUE TO ACQUIRED ATROPHY OF THYROID: Chronic | ICD-10-CM

## 2019-02-26 DIAGNOSIS — E03.4 HYPOTHYROIDISM DUE TO ACQUIRED ATROPHY OF THYROID: Primary | Chronic | ICD-10-CM

## 2019-02-26 LAB
ALBUMIN SERPL-MCNC: 3.9 G/DL (ref 3.4–5)
ALBUMIN/GLOB SERPL: 1.3 {RATIO} (ref 0.8–1.7)
ALP SERPL-CCNC: 97 U/L (ref 45–117)
ALT SERPL-CCNC: 39 U/L (ref 13–56)
ANION GAP SERPL CALC-SCNC: 7 MMOL/L (ref 3–18)
AST SERPL-CCNC: 28 U/L (ref 15–37)
BILIRUB SERPL-MCNC: 0.4 MG/DL (ref 0.2–1)
BUN SERPL-MCNC: 27 MG/DL (ref 7–18)
BUN/CREAT SERPL: 20 (ref 12–20)
CALCIUM SERPL-MCNC: 8.9 MG/DL (ref 8.5–10.1)
CHLORIDE SERPL-SCNC: 108 MMOL/L (ref 100–108)
CHOLEST SERPL-MCNC: 242 MG/DL
CO2 SERPL-SCNC: 27 MMOL/L (ref 21–32)
CREAT SERPL-MCNC: 1.32 MG/DL (ref 0.6–1.3)
GLOBULIN SER CALC-MCNC: 3.1 G/DL (ref 2–4)
GLUCOSE SERPL-MCNC: 93 MG/DL (ref 74–99)
HDLC SERPL-MCNC: 49 MG/DL (ref 40–60)
HDLC SERPL: 4.9 {RATIO} (ref 0–5)
LDLC SERPL CALC-MCNC: 155.6 MG/DL (ref 0–100)
LIPID PROFILE,FLP: ABNORMAL
POTASSIUM SERPL-SCNC: 4.7 MMOL/L (ref 3.5–5.5)
PROT SERPL-MCNC: 7 G/DL (ref 6.4–8.2)
SODIUM SERPL-SCNC: 142 MMOL/L (ref 136–145)
T4 FREE SERPL-MCNC: 1.3 NG/DL (ref 0.7–1.5)
TRIGL SERPL-MCNC: 187 MG/DL (ref ?–150)
TSH SERPL DL<=0.05 MIU/L-ACNC: 2.8 UIU/ML (ref 0.36–3.74)
VLDLC SERPL CALC-MCNC: 37.4 MG/DL

## 2019-02-26 PROCEDURE — 84439 ASSAY OF FREE THYROXINE: CPT

## 2019-02-26 PROCEDURE — 80061 LIPID PANEL: CPT

## 2019-02-26 PROCEDURE — 36415 COLL VENOUS BLD VENIPUNCTURE: CPT

## 2019-02-26 PROCEDURE — 80053 COMPREHEN METABOLIC PANEL: CPT

## 2019-02-26 RX ORDER — NYSTATIN AND TRIAMCINOLONE ACETONIDE 100000; 1 [USP'U]/G; MG/G
CREAM TOPICAL 2 TIMES DAILY
Qty: 60 G | Refills: 11 | Status: SHIPPED | OUTPATIENT
Start: 2019-02-26 | End: 2021-08-23 | Stop reason: SDUPTHER

## 2019-02-26 RX ORDER — LEVOTHYROXINE SODIUM 100 UG/1
TABLET ORAL
Qty: 126 TAB | Refills: 5 | Status: SHIPPED | OUTPATIENT
Start: 2019-02-26 | End: 2020-04-23

## 2019-02-26 NOTE — PROGRESS NOTES
HISTORY OF PRESENT ILLNESS Trisha Villavicencio is a 80 y.o. female. Visit Vitals /73 Pulse (!) 59 Temp 95.7 °F (35.4 °C) (Oral) Resp 12 Ht 5' 8\" (1.727 m) Wt 275 lb 12.8 oz (125.1 kg) SpO2 100% BMI 41.94 kg/m² Wt down 7# Had not been able walk as much this winter due to rainy weather. Review of Systems Constitutional: Negative. Respiratory: Negative for shortness of breath. Cardiovascular: Negative for chest pain and palpitations. Neurological: Negative for dizziness. Physical Exam  
Constitutional: She is oriented to person, place, and time. She appears well-developed and well-nourished. No distress. Cardiovascular: Normal rate and regular rhythm. Pulmonary/Chest: Effort normal and breath sounds normal.  
Musculoskeletal: She exhibits no edema. Neurological: She is alert and oriented to person, place, and time. Skin: Skin is warm and dry. She is not diaphoretic. Psychiatric: She has a normal mood and affect. Nursing note and vitals reviewed. ASSESSMENT and PLAN 
  ICD-10-CM ICD-9-CM 1. Hypothyroidism due to acquired atrophy of thyroid E03.4 244.8 TSH AND FREE T4  
  246.8 levothyroxine (SYNTHROID) 100 mcg tablet 2. Essential hypertension X28 320.6 METABOLIC PANEL, COMPREHENSIVE  
   LIPID PANEL 3. Obesity, morbid ([facility] [address] E66.01 278.01   
4. Medication refill Z76.0 V68.1 nystatin-triamcinolone (MYCOLOG II) topical cream  
 
 
BP controlled Update thyroid Discussed BMI/weight, lifestyle, diet and exercise. Discussed effect on blood pressure, blood sugar, and joints especially Focus on limiting white carbs, portion control, and moving more. She lost some this time Update lab Refill meds F/u June for MWV, BP, HTN

## 2019-02-26 NOTE — PROGRESS NOTES
Samantha Calabrese is a 80 y.o. female (: 1950) presenting to address: Chief Complaint Patient presents with  Hypertension  Thyroid Problem Vitals:  
 19 0837 BP: 141/69 Pulse: (!) 59 Resp: 12 Temp: 95.7 °F (35.4 °C) TempSrc: Oral  
SpO2: 100% Weight: 275 lb 12.8 oz (125.1 kg) Height: 5' 8\" (1.727 m) PainSc:   0 - No pain Hearing/Vision: No exam data present Learning Assessment:  
 
Learning Assessment 2015 PRIMARY LEARNER Patient HIGHEST LEVEL OF EDUCATION - PRIMARY LEARNER  [employer]  
BARRIERS PRIMARY LEARNER NONE PRIMARY LANGUAGE ENGLISH  
LEARNER PREFERENCE PRIMARY DEMONSTRATION  
ANSWERED BY Patient RELATIONSHIP SELF Depression Screening:  
 
3 most recent PHQ Screens 2019 Little interest or pleasure in doing things Not at all Feeling down, depressed, irritable, or hopeless Not at all Total Score PHQ 2 0 Fall Risk Assessment:  
 
Fall Risk Assessment, last 12 mths 2019 Able to walk? Yes Fall in past 12 months? No  
 
Abuse Screening:  
 
Abuse Screening Questionnaire 2018 Do you ever feel afraid of your partner? Steven Paul Are you in a relationship with someone who physically or mentally threatens you? Steven Paul Is it safe for you to go home? Erin Toribio Coordination of Care Questionaire: 1. Have you been to the ER, urgent care clinic since your last visit? Hospitalized since your last visit? NO 
 
2. Have you seen or consulted any other health care providers outside of the [address] [place] [address] since your last visit? Include any pap smears or colon screening. NO Advanced Directive: 1. Do you have an Advanced Directive? NO 
 
2. Would you like information on Advanced Directives?  NO

## 2019-04-09 ENCOUNTER — IMPORTED ENCOUNTER (OUTPATIENT)
Dept: URBAN - METROPOLITAN AREA CLINIC 1 | Facility: CLINIC | Age: 69
End: 2019-04-09

## 2019-04-09 PROBLEM — H16.143: Noted: 2019-04-09

## 2019-04-09 PROBLEM — H01.004: Noted: 2019-04-09

## 2019-04-09 PROBLEM — H01.005: Noted: 2019-04-09

## 2019-04-09 PROBLEM — H04.123: Noted: 2019-04-09

## 2019-04-09 PROBLEM — H40.023: Noted: 2019-04-09

## 2019-04-09 PROBLEM — H01.002: Noted: 2019-04-09

## 2019-04-09 PROBLEM — H01.001: Noted: 2019-04-09

## 2019-04-09 PROBLEM — H25.813: Noted: 2019-04-09

## 2019-04-09 PROBLEM — H35.033: Noted: 2019-04-09

## 2019-04-09 PROCEDURE — 92014 COMPRE OPH EXAM EST PT 1/>: CPT

## 2019-04-09 NOTE — PATIENT DISCUSSION
1.  Cataracts OU -- Observe for now without intervention. The patient was advised to contact us if any change or worsening of vision. 2. Glaucoma Suspect OU (CD: 0.80 OU) -- IOP & C/D stable OU. Positive Family h/o Glaucoma. Patient is considered high risk. Condition was discussed with patient and patient understands. Will continue to monitor patient for any progression in condition. Patient was advised to call us with any problems questions or concerns. 3.  GR I Hypertensive Retinopathy OU -- Stable continue HTN Control. 4. CANDY w/ PEK OU -- Cont ATs TID OU routinely. 5.  Anterior Blepharitis OU -- Cont Daily warm compresses and lid scrubs were recommended. Patient defers the refraction at today's visit. Return for an appointment in September 2019 for a  / [facility] / Ning León / Chele Conley / Lynford Sandifer with Dr. Selene Feldman.

## 2019-06-22 RX ORDER — METOPROLOL SUCCINATE 25 MG/1
TABLET, EXTENDED RELEASE ORAL
Qty: 90 TAB | Refills: 5 | Status: SHIPPED | OUTPATIENT
Start: 2019-06-22 | End: 2020-09-16

## 2019-06-27 ENCOUNTER — OFFICE VISIT (OUTPATIENT)
Dept: INTERNAL MEDICINE CLINIC | Age: 69
End: 2019-06-27

## 2019-06-27 ENCOUNTER — HOSPITAL ENCOUNTER (OUTPATIENT)
Dept: LAB | Age: 69
Discharge: HOME | End: 2019-06-27
Payer: MEDICARE

## 2019-06-27 VITALS
SYSTOLIC BLOOD PRESSURE: 122 MMHG | DIASTOLIC BLOOD PRESSURE: 60 MMHG | BODY MASS INDEX: 41.83 KG/M2 | HEART RATE: 59 BPM | WEIGHT: 276 LBS | OXYGEN SATURATION: 98 % | RESPIRATION RATE: 20 BRPM | HEIGHT: 68 IN | TEMPERATURE: 96.8 F

## 2019-06-27 DIAGNOSIS — E03.4 HYPOTHYROIDISM DUE TO ACQUIRED ATROPHY OF THYROID: ICD-10-CM

## 2019-06-27 DIAGNOSIS — Z12.31 ENCOUNTER FOR SCREENING MAMMOGRAM FOR MALIGNANT NEOPLASM OF BREAST: ICD-10-CM

## 2019-06-27 DIAGNOSIS — E66.01 OBESITY, MORBID (HCC): ICD-10-CM

## 2019-06-27 DIAGNOSIS — Z79.891 ENCOUNTER FOR LONG-TERM USE OF OPIATE ANALGESIC: ICD-10-CM

## 2019-06-27 DIAGNOSIS — Z76.0 MEDICATION REFILL: ICD-10-CM

## 2019-06-27 DIAGNOSIS — Z00.00 MEDICARE ANNUAL WELLNESS VISIT, SUBSEQUENT: Primary | ICD-10-CM

## 2019-06-27 DIAGNOSIS — I10 ESSENTIAL HYPERTENSION: ICD-10-CM

## 2019-06-27 PROBLEM — N18.30 CKD (CHRONIC KIDNEY DISEASE) STAGE 3, GFR 30-59 ML/MIN (HCC): Status: ACTIVE | Noted: 2019-06-27

## 2019-06-27 PROCEDURE — G0480 DRUG TEST DEF 1-7 CLASSES: HCPCS

## 2019-06-27 RX ORDER — TRAMADOL HYDROCHLORIDE 50 MG/1
50 TABLET ORAL
Qty: 270 TAB | Refills: 1 | Status: SHIPPED | OUTPATIENT
Start: 2019-06-27 | End: 2019-08-08 | Stop reason: SDUPTHER

## 2019-06-27 NOTE — PATIENT INSTRUCTIONS
Medicare Wellness Visit, Female The best way to live healthy is to have a lifestyle where you eat a well-balanced diet, exercise regularly, limit alcohol use, and quit all forms of tobacco/nicotine, if applicable. Regular preventive services are another way to keep healthy. Preventive services (vaccines, screening tests, monitoring & exams) can help personalize your care plan, which helps you manage your own care. Screening tests can find health problems at the earliest stages, when they are easiest to treat. Matias Vaughn follows the current, evidence-based guidelines published by the [facility] Florin Timbo ([facility]) when recommending preventive services for our patients. Because we follow these guidelines, sometimes recommendations change over time as research supports it. (For example, mammograms used to be recommended annually. Even though Medicare will still pay for an annual mammogram, the newer guidelines recommend a mammogram every two years for women of average risk.) Of course, you and your doctor may decide to screen more often for some diseases, based on your risk and your health status. Preventive services for you include: - Medicare offers their members a free annual wellness visit, which is time for you and your primary care provider to discuss and plan for your preventive service needs. Take advantage of this benefit every year! 
-All adults over the age of 72 should receive the recommended pneumonia vaccines. Current USPSTF guidelines recommend a series of two vaccines for the best pneumonia protection.  
-All adults should have a flu vaccine yearly and a tetanus vaccine every 10 years. All adults age 61 and older should receive a shingles vaccine once in their lifetime.   
-A bone mass density test is recommended when a woman turns 65 to screen for osteoporosis. This test is only recommended one time, as a screening. Some providers will use this same test as a disease monitoring tool if you already have osteoporosis. -All adults age 38-68 who are overweight should have a diabetes screening test once every three years.  
-Other screening tests and preventive services for persons with diabetes include: an eye exam to screen for diabetic retinopathy, a kidney function test, a foot exam, and stricter control over your cholesterol.  
-Cardiovascular screening for adults with routine risk involves an electrocardiogram (ECG) at intervals determined by your doctor.  
-Colorectal cancer screenings should be done for adults age 54-65 with no increased risk factors for colorectal cancer. There are a number of acceptable methods of screening for this type of cancer. Each test has its own benefits and drawbacks. Discuss with your doctor what is most appropriate for you during your annual wellness visit. The different tests include: colonoscopy (considered the best screening method), a fecal occult blood test, a fecal DNA test, and sigmoidoscopy. -Breast cancer screenings are recommended every other year for women of normal risk, age 54-69. 
-Cervical cancer screenings for women over age 72 are only recommended with certain risk factors.  
-All adults born between [facility] should be screened once for Hepatitis C. Here is a list of your current Health Maintenance items (your personalized list of preventive services) with a due date: 
Health Maintenance Due Topic Date Due  
 Annual Well Visit  06/27/2019  Mammogram  08/17/2019

## 2019-06-27 NOTE — PROGRESS NOTES
This is the Subsequent Medicare Annual Wellness Exam, performed 12 months or more after the Initial AWV or the last Subsequent AWV    I have reviewed the patient's medical history in detail and updated the computerized patient record. History     Past Medical History:   Diagnosis Date    Colon polyps 07/10/2017    hyperplastic    Delivery normal [id number]    DJD (degenerative joint disease) of knee     hiatal hernia     Immunization, viral disease unk    hepatitis B series    Thyroid disease       Past Surgical History:   Procedure Laterality Date    ENDOSCOPY, COLON, DIAGNOSTIC  2007    HAND/FINGER SURGERY UNLISTED  2008    trigger finger    HX COLONOSCOPY  07/10/2017    HX CYSTOCELE REPAIR      HX HYSTERECTOMY  1983    partial    HX KNEE ARTHROSCOPY Left 9/21/15    Dr. Michelle Lopez    HX ROTATOR CUFF REPAIR Right 08/09/2018    HX TONSIL AND ADENOIDECTOMY      NASAL SURG PROC UNLISTED      polyps    REMOVAL GALLBLADDER      REPAIR OF RECTOCELE      REV LOWER EYELID EXTEN FAT PAD  9/18/2013     Current Outpatient Medications   Medication Sig Dispense Refill    TOPROL XL 25 mg XL tablet TAKE 1 TABLET DAILY FOR HYPERTENSION 90 Tab 5    levothyroxine (SYNTHROID) 100 mcg tablet TAKE 2 TABLETS BEFORE BREAKFAST ON SUNDAY,  WEDNESDAY, and FRIDAY AND 1 TABLET BEFORE BREAKFAST ON ALL OTHER DAYS. 126 Tab 5    nystatin-triamcinolone (MYCOLOG II) topical cream Apply  to affected area two (2) times a day. 60 g 11    aspirin delayed-release 81 mg tablet 81 mg.      diclofenac EC (VOLTAREN) 50 mg EC tablet TAKE 1 TABLET TWICE A DAY FOR OSTEOARTHRITIS 180 Tab 4    coenzyme q10 (CO Q-10) 10 mg cap Take  by mouth.  allopurinol (ZYLOPRIM) 100 mg tablet Take 1 Tab by mouth daily.  90 Tab 5    amLODIPine (NORVASC) 5 mg tablet TAKE 1 TABLET ONCE DAILY FOR HYPERTENSION 90 Tab 5    triamterene-hydroCHLOROthiazide (MAXZIDE) 75-50 mg per tablet Take 1 Tab by mouth daily as needed. Indications: Edema 90 Tab 5    traMADol (ULTRAM) 50 mg tablet Take 1 Tab by mouth every six (6) hours as needed for Pain. 90 Tab 5    HYDROcodone-acetaminophen (NORCO)  mg tablet TK 1 TO 2 TS PO Q 4 H TO 6 H PRN P  0    HYDROcodone-acetaminophen (NORCO) 7.5-325 mg per tablet       raNITIdine (ZANTAC) 150 mg tablet 150 mg.  terbinafine HCl (LAMISIL) 250 mg tablet       pravastatin (PRAVACHOL) 40 mg tablet Take 1 Tab by mouth nightly. Indications: hypercholesterolemia 30 Tab 5    ibuprofen (MOTRIN) 800 mg tablet Take 800 mg by mouth every eight (8) hours as needed for Pain. Allergies   Allergen Reactions    Erythromycin Swelling    Statins-Hmg-Coa Reductase Inhibitors Myalgia     Family History   Problem Relation Age of Onset    Hypertension Mother     Diabetes Mother    [facility] Arthritis-osteo Mother     Thyroid Disease Sister     Thyroid Disease Brother      Social History     Tobacco Use    Smoking status: Never Smoker    Smokeless tobacco: Never Used   Substance Use Topics    Alcohol use: No     Patient Active Problem List   Diagnosis Code    Hypothyroidism due to acquired atrophy of thyroid E03.4    Essential hypertension I10    Obesity, morbid ([facility] [address] E66.01       Depression Risk Factor Screening:     3 most recent PHQ Screens 2/26/2019   Little interest or pleasure in doing things Not at all   Feeling down, depressed, irritable, or hopeless Not at all   Total Score PHQ 2 0     Alcohol Risk Factor Screening: You do not drink alcohol or very rarely. Functional Ability and Level of Safety:   Hearing Loss  Hearing is good. Activities of Daily Living  The home contains: grab bars and raised toilets  Patient does total self care    Fall Risk  Fall Risk Assessment, last 12 mths 2/26/2019   Able to walk? Yes   Fall in past 12 months?  No       Abuse Screen  Patient is not abused    Cognitive Screening   Evaluation of Cognitive Function:  Has your family/caregiver stated any concerns about your memory: no  Normal, Mini Cog test    Patient Care Team   Patient Care Team:  Sadie Singh MD as PCP - General (Internal Medicine)  Tabitha Tam MD as Consulting Provider (Ophthalmology)  Archie Peabody, MD as Consulting Provider (Orthopedic Surgery)  Meghan Eisenberg MD as Consulting Provider (Orthopedic Surgery)    Assessment/Plan   Education and counseling provided:  Are appropriate based on today's review and evaluation    Diagnoses and all orders for this visit:    1. Medicare annual wellness visit, subsequent    2. Encounter for screening mammogram for malignant neoplasm of breast  -     [facility] MAMMO BI SCREENING INCL CAD;  Future        Health Maintenance Due   Topic Date Due    MEDICARE YEARLY EXAM  06/27/2019    BREAST CANCER SCRN MAMMOGRAM  08/17/2019

## 2019-06-27 NOTE — PROGRESS NOTES
HISTORY OF PRESENT ILLNESS  Doretha Ayala is a 80 y.o. female. Visit Vitals  /60 (BP 1 Location: Right arm, BP Patient Position: Sitting)   Pulse (!) 59   Temp 96.8 °F (36 °C) (Oral)   Resp 20   Ht 5' 8\" (1.727 m)   Wt 276 lb (125.2 kg)   SpO2 98%   BMI 41.97 kg/m²                 Current Outpatient Medications:  TOPROL XL 25 mg XL tablet, TAKE 1 TABLET DAILY FOR HYPERTENSION  levothyroxine (SYNTHROID) 100 mcg tablet, TAKE 2 TABLETS BEFORE BREAKFAST ON SUNDAY,  WEDNESDAY, and FRIDAY AND 1 TABLET BEFORE BREAKFAST ON ALL OTHER DAYS. nystatin-triamcinolone (MYCOLOG II) topical cream, Apply  to affected area two (2) times a day. aspirin delayed-release 81 mg tablet, 81 mg.  diclofenac EC (VOLTAREN) 50 mg EC tablet, TAKE 1 TABLET TWICE A DAY FOR OSTEOARTHRITIS  coenzyme q10 (CO Q-10) 10 mg cap, Take  by mouth. allopurinol (ZYLOPRIM) 100 mg tablet, Take 1 Tab by mouth daily. amLODIPine (NORVASC) 5 mg tablet, TAKE 1 TABLET ONCE DAILY FOR HYPERTENSION  triamterene-hydroCHLOROthiazide (MAXZIDE) 75-50 mg per tablet, Take 1 Tab by mouth daily as needed. Indications: Edema  traMADol (ULTRAM) 50 mg tablet, Take 1 Tab by mouth every six (6) hours as needed for Pain. HYDROcodone-acetaminophen (NORCO)  mg tablet, TK 1 TO 2 TS PO Q 4 H TO 6 H PRN P  HYDROcodone-acetaminophen (NORCO) 7.5-325 mg per tablet,   raNITIdine (ZANTAC) 150 mg tablet, 150 mg.  terbinafine HCl (LAMISIL) 250 mg tablet,   pravastatin (PRAVACHOL) 40 mg tablet, Take 1 Tab by mouth nightly. Indications: hypercholesterolemia  ibuprofen (MOTRIN) 800 mg tablet, Take 800 mg by mouth every eight (8) hours as needed for Pain. Hypertension    The history is provided by the patient. This is a chronic problem. The current episode started more than 1 week ago. The problem has not changed since onset. Risk factors include obesity and postmenopause. Thyroid Problem   The history is provided by the patient. This is a chronic problem.  The current episode started more than 1 week ago. The problem occurs daily. The problem has not changed since onset. The symptoms are relieved by medications. Treatments tried: see med list.   Cholesterol Problem   The history is provided by the patient. This is a chronic problem. The current episode started more than 1 week ago. Exacerbated by: diet. Relieved by: diet. Review of Systems   Constitutional: Negative. Cardiovascular: Negative. Musculoskeletal:        Right hip bursal pain       Physical Exam   Constitutional: She is oriented to person, place, and time. She appears well-developed and well-nourished. No distress. Cardiovascular: Normal rate and regular rhythm. Pulmonary/Chest: Effort normal and breath sounds normal.   Musculoskeletal: She exhibits no edema. Neurological: She is alert and oriented to person, place, and time. Skin: Skin is warm and dry. She is not diaphoretic. Psychiatric: She has a normal mood and affect. Nursing note and vitals reviewed. ASSESSMENT and PLAN    ICD-10-CM ICD-9-CM    3. Essential hypertension I10 401.9    4. Hypothyroidism due to acquired atrophy of thyroid E03.4 244.8      246.8    5. Obesity, morbid ([address] E66.01 278.01    6. Medication refill Z76.0 V68.1 traMADol (ULTRAM) 50 mg tablet   7. Encounter for long-term use of opiate analgesic Z79.891 V58.69 TOXASSURE SELECT 13 (MW)         BP controlled    Chol so/so    Thyroid stable    Will forego lab today    Discussed BMI/weight, lifestyle, diet and exercise. Discussed effect on blood pressure, blood sugar, and joints especially  Focus on limiting white carbs, portion control, and moving more.     F/u 6 months for BP, thyroid recheck, chol

## 2019-06-27 NOTE — PROGRESS NOTES
ROOM # 4    Aaron Palm presents today for   Chief Complaint   Patient presents with    Annual Wellness Visit    Hypertension    Thyroid Problem       Aaron Palm preferred language for health care discussion is english/other. Is someone accompanying this pt? no    Is the patient using any DME equipment during OV? no    Depression Screening:  3 most recent [facility] [address] Screens 2/26/2019 6/26/2018 3/26/2018 9/12/2017 6/6/2017 2/2/2017 9/12/2016   Little interest or pleasure in doing things Not at all Not at all Not at all Not at all Not at all Not at all Not at all   Feeling down, depressed, irritable, or hopeless Not at all Not at all Not at all Not at all Not at all Not at all Not at all   Total Score PHQ 2 0 0 0 0 0 0 0       Learning Assessment:  Learning Assessment 6/24/2015   PRIMARY LEARNER Patient   HIGHEST LEVEL OF EDUCATION - PRIMARY LEARNER  [id number]am PRIMARY LEARNER NONE   PRIMARY LANGUAGE ENGLISH   LEARNER PREFERENCE PRIMARY DEMONSTRATION   ANSWERED BY Patient   RELATIONSHIP SELF       Abuse Screening:  Abuse Screening Questionnaire 6/26/2018 6/24/2015   Do you ever feel afraid of your partner? N N   Are you in a relationship with someone who physically or mentally threatens you? N N   Is it safe for you to go home? Y Y       Fall Risk  Fall Risk Assessment, last 12 mths 2/26/2019 10/26/2018 7/26/2018 6/26/2018 3/26/2018 9/12/2017 6/6/2017   Able to walk? Yes Yes Yes Yes Yes Yes Yes   Fall in past 12 months? No No No No No No No       Health Maintenance reviewed and discussed per provider. Yes    Aaron Palm is due for   Health Maintenance Due   Topic Date Due    Shingrix Vaccine Age 50> (1 of 2) 12/10/2001    MEDICARE YEARLY EXAM  06/27/2019    BREAST CANCER SCRN MAMMOGRAM  08/17/2019     Please order/place referral if appropriate. Advance Directive:  1. Do you have an advance directive in place? Patient Reply: no    2.  If not, would you like material regarding how to put one in place? Patient Reply: no    Coordination of Care:  1. Have you been to the ER, urgent care clinic since your last visit? Hospitalized since your last visit? no    2. Have you seen or consulted any other health care providers outside of the [address] [place] [address] since your last visit? Include any pap smears or colon screening.  no

## 2019-07-02 LAB — DRUGS UR: NORMAL

## 2019-07-10 DIAGNOSIS — I10 ESSENTIAL HYPERTENSION WITH GOAL BLOOD PRESSURE LESS THAN 140/90: Chronic | ICD-10-CM

## 2019-07-10 RX ORDER — AMLODIPINE BESYLATE 5 MG/1
TABLET ORAL
Qty: 90 TAB | Refills: 5 | Status: SHIPPED | OUTPATIENT
Start: 2019-07-10 | End: 2020-10-02

## 2019-08-08 DIAGNOSIS — Z76.0 MEDICATION REFILL: ICD-10-CM

## 2019-08-09 RX ORDER — TRAMADOL HYDROCHLORIDE 50 MG/1
50 TABLET ORAL
Qty: 270 TAB | Refills: 1 | Status: SHIPPED | OUTPATIENT
Start: 2019-08-09 | End: 2019-12-19 | Stop reason: SDUPTHER

## 2019-08-09 NOTE — TELEPHONE ENCOUNTER
PCP: Sandra Cooper MD    Last appt: 6/27/2019  Future Appointments   Date Time Provider Taty Tsai   10/8/2019  9:00 AM Sandra Cooper MD [address] [place] [address]   12/19/2019  8:15 AM Cortney Peterson MD A [facility] SCHED       Requested Prescriptions     Pending Prescriptions Disp Refills    traMADol (ULTRAM) 50 mg tablet 270 Tab 1     Sig: Take 1 Tab by mouth every six (6) hours as needed for Pain for up to 30 days. Request for a 30 or 90 day supply? Provider Discretion    [facility]    Other Comments:   printed and placed in PCP medication refill review folder.      Last UDS date: 06/27/2019  Pain contract signed: none

## 2019-08-25 DIAGNOSIS — Z76.0 MEDICATION REFILL: ICD-10-CM

## 2019-08-25 RX ORDER — ALLOPURINOL 100 MG/1
TABLET ORAL
Qty: 90 TAB | Refills: 4 | Status: SHIPPED | OUTPATIENT
Start: 2019-08-25 | End: 2020-11-17

## 2019-09-10 ENCOUNTER — OFFICE VISIT (OUTPATIENT)
Dept: INTERNAL MEDICINE CLINIC | Age: 69
End: 2019-09-10

## 2019-09-10 VITALS
RESPIRATION RATE: 22 BRPM | HEART RATE: 66 BPM | WEIGHT: 277 LBS | DIASTOLIC BLOOD PRESSURE: 72 MMHG | HEIGHT: 68 IN | OXYGEN SATURATION: 98 % | SYSTOLIC BLOOD PRESSURE: 180 MMHG | BODY MASS INDEX: 41.98 KG/M2 | TEMPERATURE: 98.5 F

## 2019-09-10 DIAGNOSIS — J20.9 BRONCHITIS, ACUTE, WITH BRONCHOSPASM: Primary | ICD-10-CM

## 2019-09-10 RX ORDER — CEFPROZIL 500 MG/1
500 TABLET, FILM COATED ORAL 2 TIMES DAILY
Qty: 20 TAB | Refills: 0 | Status: SHIPPED | OUTPATIENT
Start: 2019-09-10 | End: 2019-09-20

## 2019-09-10 RX ORDER — ALBUTEROL SULFATE 90 UG/1
2 AEROSOL, METERED RESPIRATORY (INHALATION)
Qty: 1 INHALER | Refills: 1 | Status: SHIPPED | OUTPATIENT
Start: 2019-09-10 | End: 2021-06-21

## 2019-09-10 RX ORDER — LEVALBUTEROL INHALATION SOLUTION 1.25 MG/3ML
1.25 SOLUTION RESPIRATORY (INHALATION)
Qty: 3 ML | Refills: 0
Start: 2019-09-10 | End: 2019-09-10

## 2019-09-10 RX ORDER — METHYLPREDNISOLONE 4 MG/1
TABLET ORAL
Qty: 1 DOSE PACK | Refills: 1 | Status: SHIPPED | OUTPATIENT
Start: 2019-09-10 | End: 2019-12-31

## 2019-09-10 RX ORDER — CODEINE PHOSPHATE AND GUAIFENESIN 10; 100 MG/5ML; MG/5ML
5 SOLUTION ORAL
Qty: 180 ML | Refills: 0 | Status: SHIPPED | OUTPATIENT
Start: 2019-09-10 | End: 2019-09-24

## 2019-09-10 NOTE — PROGRESS NOTES
ROOM # 5    Merlinda Rands presents today for   Chief Complaint   Patient presents with    Cough     X 10 days        Merlinda Rands preferred language for health care discussion is english/other. Is someone accompanying this pt? no    Is the patient using any DME equipment during OV? no    Depression Screening:  3 most recent [facility] Screens 2/26/2019 6/26/2018 3/26/2018 9/12/2017 6/6/2017 2/2/2017 9/12/2016   Little interest or pleasure in doing things Not at all Not at all Not at all Not at all Not at all Not at all Not at all   Feeling down, depressed, irritable, or hopeless Not at all Not at all Not at all Not at all Not at all Not at all Not at all   Total Score PHQ 2 0 0 0 0 0 0 0       Learning Assessment:  Learning Assessment 6/24/2015   PRIMARY LEARNER Patient   HIGHEST LEVEL OF EDUCATION - PRIMARY LEARNER  [id number] David Fam PRIMARY LEARNER NONE   PRIMARY LANGUAGE ENGLISH   LEARNER PREFERENCE PRIMARY DEMONSTRATION   ANSWERED BY Patient   RELATIONSHIP SELF       Abuse Screening:  Abuse Screening Questionnaire 6/27/2019 6/26/2018 6/24/2015   Do you ever feel afraid of your partner? N N N   Are you in a relationship with someone who physically or mentally threatens you? N N N   Is it safe for you to go home? Jesus Chambers       Fall Risk  Fall Risk Assessment, last 12 mths 2/26/2019 10/26/2018 7/26/2018 6/26/2018 3/26/2018 9/12/2017 6/6/2017   Able to walk? Yes Yes Yes Yes Yes Yes Yes   Fall in past 12 months? No No No No No No No           Health Maintenance reviewed and discussed per provider. Yes    Merlinda Rands is due for   Health Maintenance Due   Topic Date Due    Influenza Age 5 to Adult  08/01/2019    BREAST CANCER SCRN MAMMOGRAM  08/17/2019     Please order/place referral if appropriate. Advance Directive:  1. Do you have an advance directive in place? Patient Reply: no    2. If not, would you like material regarding how to put one in place? Patient Reply: no    Coordination of Care:  1. Have you been to the ER, urgent care clinic since your last visit? Hospitalized since your last visit? no    2. Have you seen or consulted any other health care providers outside of the [address] [place] [address] since your last visit? Include any pap smears or colon screening.  no

## 2019-09-10 NOTE — PROGRESS NOTES
HISTORY OF PRESENT ILLNESS  Toña Maya is a 80 y.o. female. Started with laryngitis then settled into her chest.    Was in [facility], [facility] back first week of August. Initially then she had diarrhea. That cleared. 2 weeks after returning she got these respiratory sxs. Cough   The history is provided by the patient. This is a new problem. The current episode started more than 1 week ago (10 days). The problem occurs daily. The problem has been gradually worsening. Associated symptoms include shortness of breath. Pertinent negatives include no chest pain. Treatments tried: mucinex, Robitussin AC. The treatment provided no relief. Review of Systems   Constitutional: Positive for chills (in the beginning). Negative for fever. HENT: Positive for sore throat (in the beginning). Negative for congestion and sinus pain. Respiratory: Positive for cough, shortness of breath and wheezing. Cardiovascular: Negative for chest pain and palpitations. Gastrointestinal: Positive for nausea. Physical Exam   Constitutional: She is oriented to person, place, and time. She appears well-developed and well-nourished. No distress. HENT:   Right Ear: Tympanic membrane, external ear and ear canal normal.   Left Ear: Tympanic membrane, external ear and ear canal normal.   Mouth/Throat: Uvula is midline and oropharynx is clear and moist.   Cardiovascular: Normal rate and regular rhythm. Pulmonary/Chest: Effort normal. No respiratory distress. She has wheezes. She has no rales. Wheezing is mostly upper airway   Musculoskeletal: She exhibits no edema. Neurological: She is alert and oriented to person, place, and time. Skin: Skin is warm and dry. She is not diaphoretic. Psychiatric: She has a normal mood and affect. Nursing note and vitals reviewed. ASSESSMENT and PLAN    ICD-10-CM ICD-9-CM    1.  Bronchitis, acute, with bronchospasm J20.9 466.0 methylPREDNISolone (MEDROL DOSEPACK) 4 mg tablet albuterol (PROVENTIL HFA, VENTOLIN HFA, PROAIR HFA) 90 mcg/actuation inhaler      cefPROZIL (CEFZIL) 500 mg tablet      guaiFENesin-codeine (ROBITUSSIN AC) 100-10 mg/5 mL solution      XR CHEST PA LAT      LEVALBUTEROL, INHAL. SOL., FDA-APPROVED FINAL, NON-COMPOUND UNIT DOSE, 0.5 MG      levalbuterol (XOPENEX) 1.25 mg/3 mL nebu      WA PRESSURIZED/NONPRESSURIZED INHALATION TREATMENT       Update XRay    Given severity ofd current sxs and duration, will tx with antibiotic, albuterol inhaler, and steroid. touch base in 2- 3 days. otherwise f/u as appointed          Addn: after HHN, coughing lessened and wheezing improved. Most noticeable is wheezing on RML/RLL.   Pt to return for CXR

## 2019-09-17 ENCOUNTER — IMPORTED ENCOUNTER (OUTPATIENT)
Dept: URBAN - METROPOLITAN AREA CLINIC 1 | Facility: CLINIC | Age: 69
End: 2019-09-17

## 2019-09-17 PROBLEM — H40.023: Noted: 2019-09-17

## 2019-09-17 PROBLEM — H16.143: Noted: 2019-09-17

## 2019-09-17 PROBLEM — H35.033: Noted: 2019-09-17

## 2019-09-17 PROBLEM — H04.123: Noted: 2019-09-17

## 2019-09-17 PROBLEM — H25.813: Noted: 2019-09-17

## 2019-09-17 PROBLEM — Z96.1: Noted: 2019-09-17

## 2019-09-17 PROCEDURE — 92133 CPTRZD OPH DX IMG PST SGM ON: CPT

## 2019-09-17 PROCEDURE — 92015 DETERMINE REFRACTIVE STATE: CPT

## 2019-09-17 PROCEDURE — 92012 INTRM OPH EXAM EST PATIENT: CPT

## 2019-09-17 NOTE — PATIENT DISCUSSION
1.  Glaucoma Suspect OU (CD 0.80 OU): OCT remains WNL OU. Family hx. Patient is considered high risk. Condition was discussed with patient and patient understands. Will continue to monitor patient for any progression in condition. Patient was advised to call us with any problems questions or concerns. 2.  Cataract OU:  Visually Significant secondary to glare discussed the risks benefits alternatives and limitations of cataract surgery. The patient stated a full understanding and a desire to proceed with the procedure. The patient will need to return for preop appointment with cataract measurements and to have any additional questions answered and start pre-operative eye drops as directed. **Not MF candidate Phaco PCL OS then OD Otherwise follow-up 6 months 30/glare 3. CANDY w/ PEK OU- Recommend ATs TID OU routinely 4. GR I Hypertensive Retinopathy OU- Stable continue HTN ControlReturn for an appointment for [facility]/H and PCasey with Dr. Yazan Blake.

## 2019-10-28 DIAGNOSIS — Z12.31 ENCOUNTER FOR SCREENING MAMMOGRAM FOR MALIGNANT NEOPLASM OF BREAST: ICD-10-CM

## 2019-12-19 ENCOUNTER — HOSPITAL ENCOUNTER (OUTPATIENT)
Dept: LAB | Age: 69
Discharge: HOME | End: 2019-12-19
Payer: MEDICARE

## 2019-12-19 ENCOUNTER — OFFICE VISIT (OUTPATIENT)
Dept: INTERNAL MEDICINE CLINIC | Age: 69
End: 2019-12-19

## 2019-12-19 VITALS
WEIGHT: 278 LBS | OXYGEN SATURATION: 100 % | HEIGHT: 68 IN | SYSTOLIC BLOOD PRESSURE: 143 MMHG | DIASTOLIC BLOOD PRESSURE: 70 MMHG | TEMPERATURE: 97.8 F | BODY MASS INDEX: 42.13 KG/M2 | HEART RATE: 55 BPM | RESPIRATION RATE: 20 BRPM

## 2019-12-19 DIAGNOSIS — I10 ESSENTIAL HYPERTENSION: ICD-10-CM

## 2019-12-19 DIAGNOSIS — Z76.0 MEDICATION REFILL: ICD-10-CM

## 2019-12-19 DIAGNOSIS — I10 ESSENTIAL HYPERTENSION: Primary | ICD-10-CM

## 2019-12-19 DIAGNOSIS — E78.5 DYSLIPIDEMIA: ICD-10-CM

## 2019-12-19 DIAGNOSIS — J06.9 UPPER RESPIRATORY TRACT INFECTION, UNSPECIFIED TYPE: ICD-10-CM

## 2019-12-19 DIAGNOSIS — E03.4 HYPOTHYROIDISM DUE TO ACQUIRED ATROPHY OF THYROID: ICD-10-CM

## 2019-12-19 LAB
ALBUMIN SERPL-MCNC: 3.8 G/DL (ref 3.4–5)
ALBUMIN/GLOB SERPL: 1.1 {RATIO} (ref 0.8–1.7)
ALP SERPL-CCNC: 92 U/L (ref 45–117)
ALT SERPL-CCNC: 35 U/L (ref 13–56)
ANION GAP SERPL CALC-SCNC: 7 MMOL/L (ref 3–18)
AST SERPL-CCNC: 27 U/L (ref 10–38)
BILIRUB SERPL-MCNC: 0.3 MG/DL (ref 0.2–1)
BUN SERPL-MCNC: 21 MG/DL (ref 7–18)
BUN/CREAT SERPL: 16 (ref 12–20)
CALCIUM SERPL-MCNC: 9.2 MG/DL (ref 8.5–10.1)
CHLORIDE SERPL-SCNC: 108 MMOL/L (ref 100–111)
CHOLEST SERPL-MCNC: 258 MG/DL
CO2 SERPL-SCNC: 29 MMOL/L (ref 21–32)
CREAT SERPL-MCNC: 1.29 MG/DL (ref 0.6–1.3)
GLOBULIN SER CALC-MCNC: 3.4 G/DL (ref 2–4)
GLUCOSE SERPL-MCNC: 106 MG/DL (ref 74–99)
HDLC SERPL-MCNC: 50 MG/DL (ref 40–60)
HDLC SERPL: 5.2 {RATIO} (ref 0–5)
LDLC SERPL CALC-MCNC: 167.8 MG/DL (ref 0–100)
LIPID PROFILE,FLP: ABNORMAL
POTASSIUM SERPL-SCNC: 5 MMOL/L (ref 3.5–5.5)
PROT SERPL-MCNC: 7.2 G/DL (ref 6.4–8.2)
SODIUM SERPL-SCNC: 144 MMOL/L (ref 136–145)
T4 FREE SERPL-MCNC: 1.4 NG/DL (ref 0.7–1.5)
TRIGL SERPL-MCNC: 201 MG/DL (ref ?–150)
TSH SERPL DL<=0.05 MIU/L-ACNC: 2.38 UIU/ML (ref 0.36–3.74)
VLDLC SERPL CALC-MCNC: 40.2 MG/DL

## 2019-12-19 PROCEDURE — 80053 COMPREHEN METABOLIC PANEL: CPT

## 2019-12-19 PROCEDURE — 36415 COLL VENOUS BLD VENIPUNCTURE: CPT

## 2019-12-19 PROCEDURE — 80061 LIPID PANEL: CPT

## 2019-12-19 PROCEDURE — 84439 ASSAY OF FREE THYROXINE: CPT

## 2019-12-19 RX ORDER — TRAMADOL HYDROCHLORIDE 50 MG/1
50 TABLET ORAL
Qty: 270 TAB | Refills: 1 | Status: SHIPPED | OUTPATIENT
Start: 2019-12-19 | End: 2020-01-18

## 2019-12-19 RX ORDER — AZITHROMYCIN 250 MG/1
TABLET, FILM COATED ORAL
Qty: 6 TAB | Refills: 1 | Status: SHIPPED | OUTPATIENT
Start: 2019-12-19 | End: 2019-12-31

## 2019-12-19 NOTE — PROGRESS NOTES
ROOM # 4    Kat Sykes presents today for No chief complaint on file. Kat Sykes preferred language for health care discussion is english/other. Is someone accompanying this pt? no    Is the patient using any DME equipment during OV? no    Depression Screening:  3 most recent [facility] Screens 2/26/2019 6/26/2018 3/26/2018 9/12/2017 6/6/2017 2/2/2017 9/12/2016   Little interest or pleasure in doing things Not at all Not at all Not at all Not at all Not at all Not at all Not at all   Feeling down, depressed, irritable, or hopeless Not at all Not at all Not at all Not at all Not at all Not at all Not at all   Total Score PHQ 2 0 0 0 0 0 0 0       Learning Assessment:  Learning Assessment 6/24/2015   PRIMARY LEARNER Patient   HIGHEST LEVEL OF EDUCATION - PRIMARY LEARNER  [id number] [address]am PRIMARY LEARNER NONE   PRIMARY LANGUAGE ENGLISH   LEARNER PREFERENCE PRIMARY DEMONSTRATION   ANSWERED BY Patient   RELATIONSHIP SELF       Abuse Screening:  Abuse Screening Questionnaire 6/27/2019 6/26/2018 6/24/2015   Do you ever feel afraid of your partner? N N N   Are you in a relationship with someone who physically or mentally threatens you? N N N   Is it safe for you to go home? Jerilyn Foster       Fall Risk  Fall Risk Assessment, last 12 mths 2/26/2019 10/26/2018 7/26/2018 6/26/2018 3/26/2018 9/12/2017 6/6/2017   Able to walk? Yes Yes Yes Yes Yes Yes Yes   Fall in past 12 months? No No No No No No No           Health Maintenance reviewed and discussed per provider. Yes    Kat Sykes is due for   Health Maintenance Due   Topic Date Due    Influenza Age 5 to Adult  08/01/2019     Please order/place referral if appropriate. Advance Directive:  1. Do you have an advance directive in place? Patient Reply: no    2. If not, would you like material regarding how to put one in place? Patient Reply: no    Coordination of Care:  1. Have you been to the ER, urgent care clinic since your last visit?   Hospitalized since your last visit? no    2. Have you seen or consulted any other health care providers outside of the [address] [place] [address] since your last visit? Include any pap smears or colon screening.  Eye doctor

## 2019-12-19 NOTE — PROGRESS NOTES
HISTORY OF PRESENT ILLNESS  Brinda Tomlinson is a 77 y.o. female. Visit Vitals  /70 (BP 1 Location: Left arm, BP Patient Position: Sitting)   Pulse (!) 55   Temp 97.8 °F (36.6 °C) (Oral)   Resp 20   Ht 5' 8\" (1.727 m)   Wt 278 lb (126.1 kg)   SpO2 100%   BMI 42.27 kg/m²       Since Nov 23 has had URI sxs. Hoarse, cough  Has been taking mucinex sinus meds, then claritin. Face feels like it is throbbing    Hypertension    The history is provided by the patient. This is a chronic problem. The current episode started more than 1 week ago. The problem has not changed since onset. There are no associated agents to hypertension. Risk factors include obesity, postmenopause and stress. Sinus Pain    The history is provided by the patient (see comment). This is a new problem. The current episode started more than 1 week ago. The problem has not changed since onset. Review of Systems   HENT: Positive for sinus pain. Physical Exam  Vitals signs and nursing note reviewed. Constitutional:       General: She is not in acute distress. Appearance: She is well-developed. She is not diaphoretic. Comments: Face is flushed   HENT:      Right Ear: Ear canal and external ear normal. There is no impacted cerumen. Left Ear: Ear canal and external ear normal.      Nose: Congestion present. Right Sinus: Frontal sinus tenderness present. Left Sinus: Frontal sinus tenderness present. Mouth/Throat:      Mouth: Mucous membranes are moist.   Cardiovascular:      Rate and Rhythm: Normal rate and regular rhythm. Pulmonary:      Effort: Pulmonary effort is normal.      Breath sounds: Normal breath sounds. Skin:     General: Skin is warm and dry. Neurological:      Mental Status: She is alert and oriented to person, place, and time. ASSESSMENT and PLAN    ICD-10-CM ICD-9-CM    1. Essential hypertension Q39 098.3 METABOLIC PANEL, COMPREHENSIVE      LIPID PANEL   2.  Upper respiratory tract infection, unspecified type J06.9 465.9    3. Hypothyroidism due to acquired atrophy of thyroid E03.4 244.8 TSH AND FREE T4     246.8    4. Dyslipidemia E78.5 272.4 LIPID PANEL   5. Medication refill Z76.0 V68.1 traMADol (ULTRAM) 50 mg tablet       BP up initially but she is ill, hacking and has taken OTC meds with decongstants    Update lab    zpak for sinus/URI which has persisted over 2 weeks and failed OTC meds    Discussed BMI/weight, lifestyle, diet and exercise. Discussed effect on blood pressure, blood sugar, and joints especially  Focus on limiting white carbs, portion control, and moving more.     F/u June for next MWV, HTN, chol

## 2019-12-21 DIAGNOSIS — M19.91 PRIMARY OSTEOARTHRITIS, UNSPECIFIED SITE: ICD-10-CM

## 2019-12-22 RX ORDER — DICLOFENAC SODIUM 50 MG/1
TABLET, DELAYED RELEASE ORAL
Qty: 180 TAB | Refills: 4 | Status: SHIPPED | OUTPATIENT
Start: 2019-12-22 | End: 2021-03-15

## 2019-12-26 ENCOUNTER — IMPORTED ENCOUNTER (OUTPATIENT)
Dept: URBAN - METROPOLITAN AREA CLINIC 1 | Facility: CLINIC | Age: 69
End: 2019-12-26

## 2019-12-26 PROBLEM — H25.813: Noted: 2019-12-26

## 2019-12-26 PROCEDURE — 92136 OPHTHALMIC BIOMETRY: CPT

## 2019-12-26 NOTE — PATIENT DISCUSSION
1.  Cataract OU:  Visually Significant secondary to glare discussed the risks benefits alternatives and limitations of cataract surgery. The patient stated a full understanding and a desire to proceed with the procedure. Discussed with patient if PO Gtts are more than $120 for all three combined when filling at their Pharmacy please call our office to request generic substitutions. Pt deferred upgraded IOL due to financial reasons; Pt understands they will need glasses post-op to achieve their best corrected vision. Phaco PCL OS then OD. 2.  Glaucoma Suspect OU (CD 0.80 OU) FM hx. IOP WNL OU today on no gtts. Past w/u negative. Cont to observe off gtts. 3.  CANDY w/ PEK OU- Cont ATs TID OU routinely 4. GR I Hypertensive Retinopathy OU- Stable continue HTN ControlF/u as scheduled for Phaco PCL OS then OD.

## 2020-01-02 ENCOUNTER — TELEPHONE (OUTPATIENT)
Dept: INTERNAL MEDICINE CLINIC | Age: 70
End: 2020-01-02

## 2020-01-02 NOTE — TELEPHONE ENCOUNTER
Received call from pt stating that she has been experiencing numbness and tingling in left arm for about 3 weeks. Pt was requesting a same day appt. I apologized to pt and advised that Dr. Layla Rahman had no available appts today. I suggested that pt go to nearest urgent care facility or ER. Pt verbalized understanding and advised that she had a facility near and she would report. Pt had no other concerns.

## 2020-01-08 ENCOUNTER — IMPORTED ENCOUNTER (OUTPATIENT)
Dept: URBAN - METROPOLITAN AREA CLINIC 1 | Facility: CLINIC | Age: 70
End: 2020-01-08

## 2020-01-08 PROBLEM — H25.812 COMBINED FORM OF SENILE CATARACT OF LEFT EYE: Status: ACTIVE | Noted: 2020-01-08

## 2020-01-08 PROBLEM — H25.812 COMBINED FORM OF SENILE CATARACT OF LEFT EYE: Status: RESOLVED | Noted: 2020-01-08 | Resolved: 2020-01-08

## 2020-01-09 ENCOUNTER — IMPORTED ENCOUNTER (OUTPATIENT)
Dept: URBAN - METROPOLITAN AREA CLINIC 1 | Facility: CLINIC | Age: 70
End: 2020-01-09

## 2020-01-09 PROBLEM — Z96.1: Noted: 2020-01-09

## 2020-01-09 PROCEDURE — 99024 POSTOP FOLLOW-UP VISIT: CPT

## 2020-01-09 NOTE — PATIENT DISCUSSION
POD#1 CE/IOL OS (Standard) doing well. Use Prednisolone BID OS Prolensa Qdaily OS Ocuflox TID OS : Use all three gtts through completion of PO gtt chart regimen/ Per our instructions given to patient.   Post op Warnings Reiterated RTC as scheduled

## 2020-01-16 ENCOUNTER — IMPORTED ENCOUNTER (OUTPATIENT)
Dept: URBAN - METROPOLITAN AREA CLINIC 1 | Facility: CLINIC | Age: 70
End: 2020-01-16

## 2020-01-16 PROBLEM — H25.811: Noted: 2020-01-16

## 2020-01-16 PROCEDURE — 92136 OPHTHALMIC BIOMETRY: CPT

## 2020-01-16 NOTE — PATIENT DISCUSSION
1.  Cataract OD: Visually Significant secondary to glare discussed the risks benefits alternatives and limitations of cataract surgery. The patient stated a full understanding and a desire to proceed with the procedure. Discussed with patient if PO Gtts are more than $120 for all three combined when filling at their Pharmacy please call our office to request generic substitutions. Pt understands they will need glasses post-op to achieve their best corrected vision. Phaco PCL OD 2. POW#3  CE/IOL OS (Standard) doing well. Use Prednisolone BID OS & Prolensa Qdaily OS: Use through completion of po gtt regimen.  F/u as scheduled 2nd eye

## 2020-01-22 ENCOUNTER — IMPORTED ENCOUNTER (OUTPATIENT)
Dept: URBAN - METROPOLITAN AREA CLINIC 1 | Facility: CLINIC | Age: 70
End: 2020-01-22

## 2020-01-22 PROBLEM — H25.811 COMBINED FORM OF SENILE CATARACT OF RIGHT EYE: Status: ACTIVE | Noted: 2020-01-22

## 2020-01-22 PROBLEM — H25.811 COMBINED FORM OF SENILE CATARACT OF RIGHT EYE: Status: RESOLVED | Noted: 2020-01-22 | Resolved: 2020-01-22

## 2020-01-23 ENCOUNTER — IMPORTED ENCOUNTER (OUTPATIENT)
Dept: URBAN - METROPOLITAN AREA CLINIC 1 | Facility: CLINIC | Age: 70
End: 2020-01-23

## 2020-01-23 PROBLEM — Z96.1: Noted: 2020-01-23

## 2020-01-23 PROCEDURE — 99024 POSTOP FOLLOW-UP VISIT: CPT

## 2020-01-23 NOTE — PATIENT DISCUSSION
1. POD#1 Phaco/ PCL OD (Standard)- doing well. Use Prednisolone BID OD Prolensa Qdaily OD Ocuflox TID OD : Use all three gtts through completion of PO gtt chart regimen/ Per our instructions given. Post op Warnings Reiterated 2. POW#3 Phaco/ PCL OS (Standard)- doing well Use Prednisolone BID OS & Prolensa Qdaily OS: Use through completion of po gtt regimen.  RTC as scheduled

## 2020-02-13 ENCOUNTER — IMPORTED ENCOUNTER (OUTPATIENT)
Dept: URBAN - METROPOLITAN AREA CLINIC 1 | Facility: CLINIC | Age: 70
End: 2020-02-13

## 2020-02-13 PROBLEM — Z09: Noted: 2020-02-13

## 2020-02-13 PROCEDURE — 99024 POSTOP FOLLOW-UP VISIT: CPT

## 2020-02-13 NOTE — PATIENT DISCUSSION
POW#3 Phaco/ PCL OU (Standard OU) Doing well. Early PCO OU noted today. Finish PO meds per PO gtt schedule MRX for glasses given Discussed with patient future need for YAG Cap PRN. Pt understood. Will re-evaluate next visit. Return for an appointment in September 30 glare (Consider YAG) with Dr. Nuvia Hidalgo.

## 2020-04-23 DIAGNOSIS — E03.4 HYPOTHYROIDISM DUE TO ACQUIRED ATROPHY OF THYROID: Chronic | ICD-10-CM

## 2020-04-23 RX ORDER — LEVOTHYROXINE SODIUM 100 UG/1
TABLET ORAL
Qty: 126 TAB | Refills: 3 | Status: SHIPPED | OUTPATIENT
Start: 2020-04-23 | End: 2021-04-11

## 2020-06-19 ENCOUNTER — OFFICE VISIT (OUTPATIENT)
Dept: INTERNAL MEDICINE CLINIC | Age: 70
End: 2020-06-19

## 2020-06-19 ENCOUNTER — HOSPITAL ENCOUNTER (OUTPATIENT)
Dept: LAB | Age: 70
Discharge: HOME | End: 2020-06-19
Payer: MEDICARE

## 2020-06-19 VITALS
WEIGHT: 279 LBS | RESPIRATION RATE: 17 BRPM | DIASTOLIC BLOOD PRESSURE: 68 MMHG | HEIGHT: 68 IN | BODY MASS INDEX: 42.28 KG/M2 | HEART RATE: 59 BPM | OXYGEN SATURATION: 96 % | SYSTOLIC BLOOD PRESSURE: 133 MMHG | TEMPERATURE: 98 F

## 2020-06-19 DIAGNOSIS — I10 ESSENTIAL HYPERTENSION: Chronic | ICD-10-CM

## 2020-06-19 DIAGNOSIS — E66.01 OBESITY, MORBID (HCC): ICD-10-CM

## 2020-06-19 DIAGNOSIS — E78.5 DYSLIPIDEMIA: ICD-10-CM

## 2020-06-19 DIAGNOSIS — E03.4 HYPOTHYROIDISM DUE TO ACQUIRED ATROPHY OF THYROID: Chronic | ICD-10-CM

## 2020-06-19 DIAGNOSIS — Z00.00 MEDICARE ANNUAL WELLNESS VISIT, SUBSEQUENT: Primary | ICD-10-CM

## 2020-06-19 LAB
ALBUMIN SERPL-MCNC: 3.7 G/DL (ref 3.4–5)
ALBUMIN/GLOB SERPL: 1.1 {RATIO} (ref 0.8–1.7)
ALP SERPL-CCNC: 92 U/L (ref 45–117)
ALT SERPL-CCNC: 40 U/L (ref 13–56)
ANION GAP SERPL CALC-SCNC: 6 MMOL/L (ref 3–18)
AST SERPL-CCNC: 28 U/L (ref 10–38)
BILIRUB SERPL-MCNC: 0.4 MG/DL (ref 0.2–1)
BUN SERPL-MCNC: 33 MG/DL (ref 7–18)
BUN/CREAT SERPL: 17 (ref 12–20)
CALCIUM SERPL-MCNC: 9.3 MG/DL (ref 8.5–10.1)
CHLORIDE SERPL-SCNC: 108 MMOL/L (ref 100–111)
CHOLEST SERPL-MCNC: 239 MG/DL
CO2 SERPL-SCNC: 28 MMOL/L (ref 21–32)
CREAT SERPL-MCNC: 1.93 MG/DL (ref 0.6–1.3)
GLOBULIN SER CALC-MCNC: 3.3 G/DL (ref 2–4)
GLUCOSE SERPL-MCNC: 89 MG/DL (ref 74–99)
HDLC SERPL-MCNC: 49 MG/DL (ref 40–60)
HDLC SERPL: 4.9 {RATIO} (ref 0–5)
LDLC SERPL CALC-MCNC: 151.6 MG/DL (ref 0–100)
LIPID PROFILE,FLP: ABNORMAL
POTASSIUM SERPL-SCNC: 4.7 MMOL/L (ref 3.5–5.5)
PROT SERPL-MCNC: 7 G/DL (ref 6.4–8.2)
SODIUM SERPL-SCNC: 142 MMOL/L (ref 136–145)
T4 FREE SERPL-MCNC: 1.5 NG/DL (ref 0.7–1.5)
TRIGL SERPL-MCNC: 192 MG/DL (ref ?–150)
TSH SERPL DL<=0.05 MIU/L-ACNC: 1.15 UIU/ML (ref 0.36–3.74)
VLDLC SERPL CALC-MCNC: 38.4 MG/DL

## 2020-06-19 PROCEDURE — 84439 ASSAY OF FREE THYROXINE: CPT

## 2020-06-19 PROCEDURE — 36415 COLL VENOUS BLD VENIPUNCTURE: CPT

## 2020-06-19 PROCEDURE — 80053 COMPREHEN METABOLIC PANEL: CPT

## 2020-06-19 PROCEDURE — 80061 LIPID PANEL: CPT

## 2020-06-19 RX ORDER — GABAPENTIN 100 MG/1
100 CAPSULE ORAL
COMMUNITY
End: 2021-06-21

## 2020-06-19 NOTE — PROGRESS NOTES
/68 (BP 1 Location: Right arm, BP Patient Position: Sitting)   Pulse (!) 59   Temp 98 °F (36.7 °C) (Temporal)   Resp 17   Ht 5' 8\" (1.727 m)   Wt 279 lb (126.6 kg)   SpO2 96%   BMI 42.42 kg/m²       This is the Subsequent Medicare Annual Wellness Exam, performed 12 months or more after the Initial AWV or the last Subsequent AWV    I have reviewed the patient's medical history in detail and updated the computerized patient record.      History     Patient Active Problem List   Diagnosis Code    Hypothyroidism due to acquired atrophy of thyroid E03.4    Essential hypertension I10    Obesity, morbid ([facility] [address] E66.01    CKD (chronic kidney disease) stage 3, GFR 30-59 ml/min ([facility]) N18.3     Past Medical History:   Diagnosis Date    Colon polyps 07/10/2017    hyperplastic    Delivery normal [id number],9701    DJD (degenerative joint disease) of knee     hiatal hernia     Immunization, viral disease unk    hepatitis B series    Rotator cuff tear 08/08/2019    right, complete tear    Thyroid disease       Past Surgical History:   Procedure Laterality Date    ENDOSCOPY, COLON, DIAGNOSTIC  2007    HAND/FINGER SURGERY UNLISTED  2008    trigger finger    HX CARPAL TUNNEL RELEASE Left 03/05/2020    HX CATARACT REMOVAL Left 01/08/2020    HX COLONOSCOPY  07/10/2017    HX CYSTOCELE REPAIR      HX HYSTERECTOMY  1983    partial    HX KNEE ARTHROSCOPY Left 9/21/15    Dr. Rubina Giron April    HX ORTHOPAEDIC Left 03/04/2020    left cubital tunnel release    HX ROTATOR CUFF REPAIR Right 08/09/2018    HX ROTATOR CUFF REPAIR Right 08/08/2019    Dr. Kingsley Nolen UNLISTED      polyps    VT REV LOWER EYELID EXTEN FAT PAD  9/18/2013    REMOVAL GALLBLADDER      REPAIR OF RECTOCELE       Current Outpatient Medications   Medication Sig Dispense Refill    gabapentin (Neurontin) 100 mg capsule Take 100 mg by mouth nightly.  levothyroxine (SYNTHROID) 100 mcg tablet TAKE 2 TABLETS BEFORE BREAKFAST ON , WEDNESDAY AND FRIDAY. TAKE 1 TABLET BEFORE BREAKFAST ON ALL OTHER DAYS 126 Tab 3    diclofenac EC (VOLTAREN) 50 mg EC tablet TAKE 1 TABLET TWICE A DAY FOR OSTEOARTHRITIS 180 Tab 4    albuterol (PROVENTIL HFA, VENTOLIN HFA, PROAIR HFA) 90 mcg/actuation inhaler Take 2 Puffs by inhalation every four (4) hours as needed for Wheezing. 1 Inhaler 1    allopurinol (ZYLOPRIM) 100 mg tablet TAKE 1 TABLET DAILY 90 Tab 4    amLODIPine (NORVASC) 5 mg tablet TAKE 1 TABLET DAILY FOR HYPERTENSION 90 Tab 5    TOPROL XL 25 mg XL tablet TAKE 1 TABLET DAILY FOR HYPERTENSION 90 Tab 5    nystatin-triamcinolone (MYCOLOG II) topical cream Apply  to affected area two (2) times a day. (Patient taking differently: Apply  to affected area two (2) times daily as needed.) 60 g 11    aspirin delayed-release 81 mg tablet 81 mg.      coenzyme q10 (CO Q-10) 10 mg cap Take  by mouth.  triamterene-hydroCHLOROthiazide (MAXZIDE) 75-50 mg per tablet Take 1 Tab by mouth daily as needed.  Indications: Edema 90 Tab 5     Allergies   Allergen Reactions    Erythromycin Swelling    Statins-Hmg-Coa Reductase Inhibitors Myalgia       Family History   Problem Relation Age of Onset    Hypertension Mother     Diabetes Mother     Arthritis-osteo Mother     Thyroid Disease Sister     Thyroid Disease Brother      Social History     Tobacco Use    Smoking status: Former Smoker     Packs/day: 0.50     Years: 15.00     Pack years: 7.50     Types: Cigarettes     Last attempt to quit: 2003     Years since quittin.0    Smokeless tobacco: Never Used    Tobacco comment: Quit 15 years ago   Substance Use Topics    Alcohol use: No       Depression Risk Factor Screening:     3 most recent PHQ Screens 2020   Little interest or pleasure in doing things Not at all   Feeling down, depressed, irritable, or hopeless Not at all   Total Score PHQ 2 0       Alcohol Risk Factor Screening:   Do you average 1 drink per night or more than 7 drinks a week:  No    On any one occasion in the past three months have you have had more than 3 drinks containing alcohol:  Yes      Functional Ability and Level of Safety:   Hearing: Hearing is good. Activities of Daily Living: The home contains: grab bars. Night light in the bath    Patient does total self care     Ambulation: with no difficulty     Fall Risk:  Fall Risk Assessment, last 12 mths 6/19/2020   Able to walk? Yes   Fall in past 12 months? No     Abuse Screen:  Patient is not abused       Cognitive Screening   Has your family/caregiver stated any concerns about your memory: no     Cognitive Screening: Normal - Animal Naming Test    Patient Care Team   Patient Care Team:  Cesar Pruitt MD as PCP - General (Internal Medicine)  Cesar Pruitt MD as PCP - [facility] Provider  Shira Bentley MD as Consulting Provider (Ophthalmology)  Adan Nelson MD as Consulting Provider (Orthopedic Surgery)  Burgess Thaddeus MD as Consulting Provider (Orthopedic Surgery)  WILDER Pack (Physician Assistant)    Assessment/Plan   Education and counseling provided:  Are appropriate based on today's review and evaluation    Diagnoses and all orders for this visit:    1.  Medicare annual wellness visit, subsequent        Health Maintenance Due   Topic Date Due    Shingrix Vaccine Age 49> (1 of 2) 12/10/2001    GLAUCOMA SCREENING Q2Y  04/10/2020    Medicare Yearly Exam  06/27/2020    Colonoscopy  07/10/2020

## 2020-06-19 NOTE — PATIENT INSTRUCTIONS
Medicare Wellness Visit, Female The best way to live healthy is to have a lifestyle where you eat a well-balanced diet, exercise regularly, limit alcohol use, and quit all forms of tobacco/nicotine, if applicable. Regular preventive services are another way to keep healthy. Preventive services (vaccines, screening tests, monitoring & exams) can help personalize your care plan, which helps you manage your own care. Screening tests can find health problems at the earliest stages, when they are easiest to treat. [facility] follows the current, evidence-based guidelines published by the [facility] Florin Izquierdo ([facility]) when recommending preventive services for our patients. Because we follow these guidelines, sometimes recommendations change over time as research supports it. (For example, mammograms used to be recommended annually. Even though Medicare will still pay for an annual mammogram, the newer guidelines recommend a mammogram every two years for women of average risk). Of course, you and your doctor may decide to screen more often for some diseases, based on your risk and your co-morbidities (chronic disease you are already diagnosed with). Preventive services for you include: - Medicare offers their members a free annual wellness visit, which is time for you and your primary care provider to discuss and plan for your preventive service needs. Take advantage of this benefit every year! 
-All adults over the age of 72 should receive the recommended pneumonia vaccines. Current USPSTF guidelines recommend a series of two vaccines for the best pneumonia protection.  
-All adults should have a flu vaccine yearly and a tetanus vaccine every 10 years.  
-All adults age 48 and older should receive the shingles vaccines (series of two vaccines). -All adults age 38-68 who are overweight should have a diabetes screening test once every three years. -All adults born between [birth year] and 1964 should be screened once for Hepatitis C. 
-Other screening tests and preventive services for persons with diabetes include: an eye exam to screen for diabetic retinopathy, a kidney function test, a foot exam, and stricter control over your cholesterol.  
-Cardiovascular screening for adults with routine risk involves an electrocardiogram (ECG) at intervals determined by your doctor.  
-Colorectal cancer screenings should be done for adults age 54-65 with no increased risk factors for colorectal cancer. There are a number of acceptable methods of screening for this type of cancer. Each test has its own benefits and drawbacks. Discuss with your doctor what is most appropriate for you during your annual wellness visit. The different tests include: colonoscopy (considered the best screening method), a fecal occult blood test, a fecal DNA test, and sigmoidoscopy. 
 
-A bone mass density test is recommended when a woman turns 65 to screen for osteoporosis. This test is only recommended one time, as a screening. Some providers will use this same test as a disease monitoring tool if you already have osteoporosis. -Breast cancer screenings are recommended every other year for women of normal risk, age 54-69. 
-Cervical cancer screenings for women over age 72 are only recommended with certain risk factors. Here is a list of your current Health Maintenance items (your personalized list of preventive services) with a due date: 
Health Maintenance Due Topic Date Due  Shingles Vaccine (1 of 2) 12/10/2001  Glaucoma Screening   04/10/2020 [email or web address] Annual Well Visit  06/27/2020  Colonoscopy  07/10/2020

## 2020-06-19 NOTE — PROGRESS NOTES
HISTORY OF PRESENT ILLNESS  Kathie Naylor is a 77 y.o. female. Visit Vitals  /68 (BP 1 Location: Right arm, BP Patient Position: Sitting)   Pulse (!) 59   Temp 98 °F (36.7 °C) (Temporal)   Resp 17   Ht 5' 8\" (1.727 m)   Wt 279 lb (126.6 kg)   SpO2 96%   BMI 42.42 kg/m²               Current Outpatient Medications:  gabapentin (Neurontin) 100 mg capsule, Take 100 mg by mouth nightly. levothyroxine (SYNTHROID) 100 mcg tablet, TAKE 2 TABLETS BEFORE BREAKFAST ON SUNDAY, WEDNESDAY AND FRIDAY. TAKE 1 TABLET BEFORE BREAKFAST ON ALL OTHER DAYS  diclofenac EC (VOLTAREN) 50 mg EC tablet, TAKE 1 TABLET TWICE A DAY FOR OSTEOARTHRITIS  albuterol (PROVENTIL HFA, VENTOLIN HFA, PROAIR HFA) 90 mcg/actuation inhaler, Take 2 Puffs by inhalation every four (4) hours as needed for Wheezing. allopurinol (ZYLOPRIM) 100 mg tablet, TAKE 1 TABLET DAILY  amLODIPine (NORVASC) 5 mg tablet, TAKE 1 TABLET DAILY FOR HYPERTENSION  TOPROL XL 25 mg XL tablet, TAKE 1 TABLET DAILY FOR HYPERTENSION  nystatin-triamcinolone (MYCOLOG II) topical cream, Apply  to affected area two (2) times a day. (Patient taking differently: Apply  to affected area two (2) times daily as needed.)  aspirin delayed-release 81 mg tablet, 81 mg.  coenzyme q10 (CO Q-10) 10 mg cap, Take  by mouth.  triamterene-hydroCHLOROthiazide (MAXZIDE) 75-50 mg per tablet, Take 1 Tab by mouth daily as needed. Indications: Edema        Hypertension    The history is provided by the patient. This is a chronic problem. The current episode started more than 1 week ago. The problem has not changed since onset. Pertinent negatives include no chest pain, no palpitations, no headaches, no dizziness and no shortness of breath. There are no associated agents to hypertension. Risk factors include obesity and dyslipidemia. Cholesterol Problem   The history is provided by the patient. This is a chronic problem. The current episode started more than 1 week ago. The problem occurs daily.  The problem has not changed since onset. Pertinent negatives include no chest pain, no headaches and no shortness of breath. Thyroid Problem   The history is provided by the patient. This is a chronic problem. The current episode started more than 1 week ago. The problem occurs daily. The problem has not changed since onset. Pertinent negatives include no chest pain, no headaches and no shortness of breath. The symptoms are relieved by medications. Review of Systems   Constitutional: Negative. Respiratory: Negative for cough and shortness of breath. Cardiovascular: Negative for chest pain and palpitations. Neurological: Negative for dizziness and headaches. Physical Exam  Vitals signs and nursing note reviewed. Constitutional:       Appearance: She is well-developed. Cardiovascular:      Rate and Rhythm: Normal rate and regular rhythm. Pulmonary:      Effort: Pulmonary effort is normal.      Breath sounds: Normal breath sounds. Skin:     General: Skin is warm and dry. Neurological:      General: No focal deficit present. Mental Status: She is alert and oriented to person, place, and time. Psychiatric:         Mood and Affect: Mood normal.         ASSESSMENT and PLAN    ICD-10-CM ICD-9-CM           2. Essential hypertension I41 133.2 METABOLIC PANEL, COMPREHENSIVE   3. Hypothyroidism due to acquired atrophy of thyroid E03.4 244.8 TSH AND FREE T4     246.8    4. Dyslipidemia E78.5 272.4 LIPID PANEL   5. Obesity, morbid (HCC) E66.01 278.01        BP controlled    Discussed BMI/weight, lifestyle, diet and exercise. Discussed effect on blood pressure, blood sugar, and joints especially  Focus on limiting white carbs, portion control, and moving more.     Due for updated lab    F/u 6 months the HTN, CHOL, thyroid

## 2020-06-19 NOTE — PROGRESS NOTES
ROOM #  4  Identified pt with two pt identifiers(name and ). Reviewed record in preparation for visit and have obtained necessary documentation. Chief Complaint   Patient presents with    Annual Wellness Visit      Devin Wilks preferred language for health care discussion is english/other. Is the patient using any DME equipment during OV? Katelyn Romo is due for:  Health Maintenance Due   Topic    Shingrix Vaccine Age 50> (1 of 2)    GLAUCOMA SCREENING Q2Y     Medicare Yearly Exam     Colonoscopy      Health Maintenance reviewed and discussed per provider  Please order/place referral if appropriate. Advance Directive:  1. Do you have an advance directive in place? Patient Reply: NO    2. If not, would you like material regarding how to put one in place? NO    Coordination of Care:  1. Have you been to the ER, urgent care clinic since your last visit? Hospitalized since your last visit? NO    2. Have you seen or consulted any other health care providers outside of the [address] [place] [address] since your last visit? Include any pap smears or colon screening. NO    Patient is accompanied by self I have received verbal consent from Devin Wilks to discuss any/all medical information while they are present in the room.     Learning Assessment:  Learning Assessment 2015   PRIMARY LEARNER Patient   HIGHEST LEVEL OF EDUCATION - PRIMARY LEARNER  [employer]   BARRIERS PRIMARY LEARNER NONE   PRIMARY LANGUAGE ENGLISH   LEARNER PREFERENCE PRIMARY DEMONSTRATION   ANSWERED BY Patient   RELATIONSHIP SELF     Depression Screening:  3 most recent [facility] Screens 2020 2019 2018 3/26/2018 2017 2017 2017   Little interest or pleasure in doing things Not at all Not at all Not at all Not at all Not at all Not at all Not at all   Feeling down, depressed, irritable, or hopeless Not at all Not at all Not at all Not at all Not at all Not at all Not at all   Total Score PHQ 2 0 0 0 0 0 0 0     Abuse Screening:  Abuse Screening Questionnaire 6/27/2019 6/26/2018 6/24/2015   Do you ever feel afraid of your partner? N N N   Are you in a relationship with someone who physically or mentally threatens you? N N N   Is it safe for you to go home? Tristen Russell     Fall Risk  Fall Risk Assessment, last 12 mths 6/19/2020 2/26/2019 10/26/2018 7/26/2018 6/26/2018 3/26/2018 9/12/2017   Able to walk? Yes Yes Yes Yes Yes Yes Yes   Fall in past 12 months?  No No No No No No No

## 2020-09-15 ENCOUNTER — IMPORTED ENCOUNTER (OUTPATIENT)
Dept: URBAN - METROPOLITAN AREA CLINIC 1 | Facility: CLINIC | Age: 70
End: 2020-09-15

## 2020-09-15 PROBLEM — H01.001: Noted: 2020-09-15

## 2020-09-15 PROBLEM — Z96.1: Noted: 2020-09-15

## 2020-09-15 PROBLEM — H16.143: Noted: 2020-09-15

## 2020-09-15 PROBLEM — H35.033: Noted: 2020-09-15

## 2020-09-15 PROBLEM — H40.023: Noted: 2020-09-15

## 2020-09-15 PROBLEM — H04.123: Noted: 2020-09-15

## 2020-09-15 PROBLEM — H26.493: Noted: 2020-09-15

## 2020-09-15 PROBLEM — H01.004: Noted: 2020-09-15

## 2020-09-15 PROCEDURE — 92014 COMPRE OPH EXAM EST PT 1/>: CPT

## 2020-09-15 PROCEDURE — 92133 CPTRZD OPH DX IMG PST SGM ON: CPT

## 2020-09-15 PROCEDURE — 92015 DETERMINE REFRACTIVE STATE: CPT

## 2020-09-15 NOTE — PATIENT DISCUSSION
1.  Glaucoma Suspect OU (CD 0.80 OU): No significant change shown on OCT. IOP stable. Family hx. Patient is considered high risk. Condition was discussed with patient and patient understands. Will continue to monitor patient for any progression in condition. Patient was advised to call us with any problems questions or concerns. 2.  CANDY w/ PEK OU- Recommend increase ATs QID OU routinely 3. PCO OU: (Posterior Capsule Opacification)   Observe and consider yag cap when pt feels pco visually significant and visual acuity decreases to appropriate level. 4. Pseudophakia OU - (Standard OU) 5. Anterior Blepharitis OU - Daily Hot compresses and lid scrubs were recommended. 6. GR I Hypertensive Retinopathy OU- Stable continue HTN Control MRX for glasses given. Return for an appointment in 1 year 30/OCT/glare with Dr. J Carlos Moctezuma.

## 2020-10-02 DIAGNOSIS — I10 ESSENTIAL HYPERTENSION WITH GOAL BLOOD PRESSURE LESS THAN 140/90: Chronic | ICD-10-CM

## 2020-10-02 RX ORDER — AMLODIPINE BESYLATE 5 MG/1
TABLET ORAL
Qty: 90 TAB | Refills: 3 | Status: SHIPPED | OUTPATIENT
Start: 2020-10-02 | End: 2021-09-06

## 2020-11-17 DIAGNOSIS — Z76.0 MEDICATION REFILL: ICD-10-CM

## 2020-11-17 RX ORDER — ALLOPURINOL 100 MG/1
TABLET ORAL
Qty: 90 TAB | Refills: 3 | Status: SHIPPED | OUTPATIENT
Start: 2020-11-17 | End: 2021-11-12

## 2020-12-21 ENCOUNTER — OFFICE VISIT (OUTPATIENT)
Dept: INTERNAL MEDICINE CLINIC | Age: 70
End: 2020-12-21
Payer: MEDICARE

## 2020-12-21 ENCOUNTER — HOSPITAL ENCOUNTER (OUTPATIENT)
Dept: LAB | Age: 70
Discharge: HOME | End: 2020-12-21
Payer: MEDICARE

## 2020-12-21 VITALS
RESPIRATION RATE: 18 BRPM | HEIGHT: 68 IN | SYSTOLIC BLOOD PRESSURE: 155 MMHG | WEIGHT: 273 LBS | OXYGEN SATURATION: 98 % | DIASTOLIC BLOOD PRESSURE: 73 MMHG | BODY MASS INDEX: 41.37 KG/M2 | HEART RATE: 55 BPM | TEMPERATURE: 97.3 F

## 2020-12-21 DIAGNOSIS — E78.5 DYSLIPIDEMIA: ICD-10-CM

## 2020-12-21 DIAGNOSIS — E66.01 OBESITY, MORBID (HCC): ICD-10-CM

## 2020-12-21 DIAGNOSIS — M25.521 ELBOW PAIN, RIGHT: ICD-10-CM

## 2020-12-21 DIAGNOSIS — E03.4 HYPOTHYROIDISM DUE TO ACQUIRED ATROPHY OF THYROID: ICD-10-CM

## 2020-12-21 DIAGNOSIS — I10 ESSENTIAL HYPERTENSION WITH GOAL BLOOD PRESSURE LESS THAN 140/90: Primary | ICD-10-CM

## 2020-12-21 DIAGNOSIS — I10 ESSENTIAL HYPERTENSION WITH GOAL BLOOD PRESSURE LESS THAN 140/90: ICD-10-CM

## 2020-12-21 LAB
ALBUMIN SERPL-MCNC: 3.6 G/DL (ref 3.4–5)
ALBUMIN/GLOB SERPL: 1.2 {RATIO} (ref 0.8–1.7)
ALP SERPL-CCNC: 92 U/L (ref 45–117)
ALT SERPL-CCNC: 33 U/L (ref 13–56)
ANION GAP SERPL CALC-SCNC: 5 MMOL/L (ref 3–18)
AST SERPL-CCNC: 21 U/L (ref 10–38)
BILIRUB SERPL-MCNC: 0.7 MG/DL (ref 0.2–1)
BUN SERPL-MCNC: 19 MG/DL (ref 7–18)
BUN/CREAT SERPL: 17 (ref 12–20)
CALCIUM SERPL-MCNC: 9.1 MG/DL (ref 8.5–10.1)
CHLORIDE SERPL-SCNC: 109 MMOL/L (ref 100–111)
CHOLEST SERPL-MCNC: 242 MG/DL
CO2 SERPL-SCNC: 28 MMOL/L (ref 21–32)
CREAT SERPL-MCNC: 1.15 MG/DL (ref 0.6–1.3)
GLOBULIN SER CALC-MCNC: 3.1 G/DL (ref 2–4)
GLUCOSE SERPL-MCNC: 96 MG/DL (ref 74–99)
HDLC SERPL-MCNC: 51 MG/DL (ref 40–60)
HDLC SERPL: 4.7 {RATIO} (ref 0–5)
LDLC SERPL CALC-MCNC: 159.6 MG/DL (ref 0–100)
LIPID PROFILE,FLP: ABNORMAL
POTASSIUM SERPL-SCNC: 4.8 MMOL/L (ref 3.5–5.5)
PROT SERPL-MCNC: 6.7 G/DL (ref 6.4–8.2)
SODIUM SERPL-SCNC: 142 MMOL/L (ref 136–145)
T4 FREE SERPL-MCNC: 1.4 NG/DL (ref 0.7–1.5)
TRIGL SERPL-MCNC: 157 MG/DL (ref ?–150)
TSH SERPL DL<=0.05 MIU/L-ACNC: 1.16 UIU/ML (ref 0.36–3.74)
VLDLC SERPL CALC-MCNC: 31.4 MG/DL

## 2020-12-21 PROCEDURE — G8417 CALC BMI ABV UP PARAM F/U: HCPCS | Performed by: INTERNAL MEDICINE

## 2020-12-21 PROCEDURE — 36415 COLL VENOUS BLD VENIPUNCTURE: CPT

## 2020-12-21 PROCEDURE — G8399 PT W/DXA RESULTS DOCUMENT: HCPCS | Performed by: INTERNAL MEDICINE

## 2020-12-21 PROCEDURE — G8427 DOCREV CUR MEDS BY ELIG CLIN: HCPCS | Performed by: INTERNAL MEDICINE

## 2020-12-21 PROCEDURE — 84439 ASSAY OF FREE THYROXINE: CPT

## 2020-12-21 PROCEDURE — 1101F PT FALLS ASSESS-DOCD LE1/YR: CPT | Performed by: INTERNAL MEDICINE

## 2020-12-21 PROCEDURE — 1090F PRES/ABSN URINE INCON ASSESS: CPT | Performed by: INTERNAL MEDICINE

## 2020-12-21 PROCEDURE — 3017F COLORECTAL CA SCREEN DOC REV: CPT | Performed by: INTERNAL MEDICINE

## 2020-12-21 PROCEDURE — 80061 LIPID PANEL: CPT

## 2020-12-21 PROCEDURE — G9899 SCRN MAM PERF RSLTS DOC: HCPCS | Performed by: INTERNAL MEDICINE

## 2020-12-21 PROCEDURE — 80053 COMPREHEN METABOLIC PANEL: CPT

## 2020-12-21 PROCEDURE — G8536 NO DOC ELDER MAL SCRN: HCPCS | Performed by: INTERNAL MEDICINE

## 2020-12-21 PROCEDURE — G8432 DEP SCR NOT DOC, RNG: HCPCS | Performed by: INTERNAL MEDICINE

## 2020-12-21 PROCEDURE — 99214 OFFICE O/P EST MOD 30 MIN: CPT | Performed by: INTERNAL MEDICINE

## 2020-12-21 PROCEDURE — G8754 DIAS BP LESS 90: HCPCS | Performed by: INTERNAL MEDICINE

## 2020-12-21 PROCEDURE — G8753 SYS BP > OR = 140: HCPCS | Performed by: INTERNAL MEDICINE

## 2020-12-21 RX ORDER — IBUPROFEN 600 MG/1
600 TABLET ORAL
Qty: 120 TAB | Refills: 1 | Status: SHIPPED | OUTPATIENT
Start: 2020-12-21

## 2020-12-21 NOTE — PROGRESS NOTES
ROOM # 4    Khadijah Butts presents today for   Chief Complaint   Patient presents with    Hypertension    Cholesterol Problem       Khadijah Butts preferred language for health care discussion is english/other. Is someone accompanying this pt? no    Is the patient using any DME equipment during OV? no    Depression Screening:  3 most recent [facility] Screens 6/19/2020 2/26/2019 6/26/2018 3/26/2018 9/12/2017 6/6/2017 2/2/2017   Little interest or pleasure in doing things Not at all Not at all Not at all Not at all Not at all Not at all Not at all   Feeling down, depressed, irritable, or hopeless Not at all Not at all Not at all Not at all Not at all Not at all Not at all   Total Score PHQ 2 0 0 0 0 0 0 0       Learning Assessment:  Learning Assessment 6/24/2015   PRIMARY LEARNER Patient   HIGHEST LEVEL OF EDUCATION - PRIMARY LEARNER  [address] PRIMARY LEARNER NONE   PRIMARY LANGUAGE ENGLISH   LEARNER PREFERENCE PRIMARY DEMONSTRATION   ANSWERED BY Patient   RELATIONSHIP SELF       Abuse Screening:  Abuse Screening Questionnaire 6/27/2019 6/26/2018 6/24/2015   Do you ever feel afraid of your partner? N N N   Are you in a relationship with someone who physically or mentally threatens you? N N N   Is it safe for you to go home? Rosalina Ramsey       Fall Risk  Fall Risk Assessment, last 12 mths 6/19/2020 2/26/2019 10/26/2018 7/26/2018 6/26/2018 3/26/2018 9/12/2017   Able to walk? Yes Yes Yes Yes Yes Yes Yes   Fall in past 12 months? No No No No No No No           Health Maintenance reviewed and discussed per provider. Yes    Khadijah Butts is due for   Health Maintenance Due   Topic Date Due    Shingrix Vaccine Age 49> (1 of 2) 12/10/2001    Colorectal Cancer Screening Combo  07/10/2020    Flu Vaccine (1) 09/01/2020    DTaP/Tdap/Td series (2 - Td) 10/11/2020     Please order/place referral if appropriate. Advance Directive:  1. Do you have an advance directive in place? Patient Reply: no    2.  If not, would you like material regarding how to put one in place? Patient Reply: no    Coordination of Care:  1. Have you been to the ER, urgent care clinic since your last visit? Hospitalized since your last visit? no    2. Have you seen or consulted any other health care providers outside of the [address] [place] [address] since your last visit? Include any pap smears or colon screening.  ortho

## 2020-12-21 NOTE — PROGRESS NOTES
HISTORY OF PRESENT ILLNESS  Babar Elliott is a 72 y.o. female. Visit Vitals  BP (!) 155/73 (BP 1 Location: Left arm, BP Patient Position: Sitting)   Pulse (!) 55   Temp 97.3 °F (36.3 °C) (Temporal)   Resp 18   Ht 5' 8\" (1.727 m)   Wt 273 lb (123.8 kg)   SpO2 98%   BMI 41.51 kg/m²       Mother is in hospice. She will be returning to [place] soon    Hypertension   The history is provided by the patient. This is a chronic problem. The current episode started more than 1 week ago. The problem has not changed since onset. Pertinent negatives include no chest pain, no palpitations, no headaches, no dizziness and no shortness of breath. There are no associated agents to hypertension. Risk factors include postmenopause, obesity, a sedentary lifestyle and dyslipidemia. Cholesterol Problem  The history is provided by the patient. This is a chronic problem. The current episode started more than 1 week ago. The problem occurs daily. The problem has not changed since onset. Pertinent negatives include no chest pain, no headaches and no shortness of breath. Exacerbated by: diet. Relieved by: diet. Treatments tried: see med list.   Elbow Pain   The history is provided by the patient. This is a new problem. The current episode started more than 1 week ago. The problem occurs daily. The problem has not changed since onset. Review of Systems   Constitutional: Negative for chills and fever. Respiratory: Negative for cough and shortness of breath. Cardiovascular: Negative for chest pain and palpitations. Neurological: Negative for dizziness and headaches. Physical Exam  Vitals signs and nursing note reviewed. Constitutional:       Appearance: Normal appearance. She is well-developed. Cardiovascular:      Rate and Rhythm: Normal rate and regular rhythm. Pulmonary:      Effort: Pulmonary effort is normal.      Breath sounds: Normal breath sounds.    Musculoskeletal:      Comments: Medial epicondylar pain on palpation on right elbow. Skin:     General: Skin is warm and dry. Neurological:      General: No focal deficit present. Mental Status: She is alert and oriented to person, place, and time. Psychiatric:         Mood and Affect: Mood normal.         Behavior: Behavior normal.         ASSESSMENT and PLAN    ICD-10-CM ICD-9-CM    1. Essential hypertension with goal blood pressure less than 140/90  X05 165.7 METABOLIC PANEL, COMPREHENSIVE   2. Dyslipidemia  E78.5 272.4 LIPID PANEL   3. Hypothyroidism due to acquired atrophy of thyroid  E03.4 244.8 TSH AND FREE T4     246.8    4. Obesity, morbid ([facility] [address]  E66.01 278.01    5.  Elbow pain, right  M25.521 719.42 ibuprofen (MOTRIN) 600 mg tablet       BP up some today    But under some stress, and has been up since 3 AM    Update lab    Will continue to work on diet and exercise    Elbow pain-- use elbow support, Topical nsaids as well as oral. May need injection    F/u 6 months for MWV, HTN, chol

## 2021-02-03 RX ORDER — METOPROLOL SUCCINATE 25 MG/1
25 TABLET, EXTENDED RELEASE ORAL DAILY
Qty: 90 TAB | Refills: 3 | Status: SHIPPED | OUTPATIENT
Start: 2021-02-03 | End: 2021-04-27 | Stop reason: SDUPTHER

## 2021-03-15 DIAGNOSIS — M19.91 PRIMARY OSTEOARTHRITIS, UNSPECIFIED SITE: ICD-10-CM

## 2021-03-15 RX ORDER — DICLOFENAC SODIUM 50 MG/1
TABLET, DELAYED RELEASE ORAL
Qty: 180 TAB | Refills: 3 | Status: SHIPPED | OUTPATIENT
Start: 2021-03-15 | End: 2022-02-15

## 2021-04-10 DIAGNOSIS — E03.4 HYPOTHYROIDISM DUE TO ACQUIRED ATROPHY OF THYROID: Chronic | ICD-10-CM

## 2021-04-11 RX ORDER — LEVOTHYROXINE SODIUM 100 UG/1
TABLET ORAL
Qty: 126 TAB | Refills: 3 | Status: SHIPPED | OUTPATIENT
Start: 2021-04-11

## 2021-04-27 ENCOUNTER — PATIENT MESSAGE (OUTPATIENT)
Dept: INTERNAL MEDICINE CLINIC | Age: 71
End: 2021-04-27

## 2021-04-27 RX ORDER — METOPROLOL SUCCINATE 25 MG/1
25 TABLET, EXTENDED RELEASE ORAL DAILY
Qty: 90 TAB | Refills: 3 | Status: SHIPPED | OUTPATIENT
Start: 2021-04-27

## 2021-04-27 NOTE — TELEPHONE ENCOUNTER
From: John Gonzales  To: Jaskaran Sheth MD  Sent: 4/27/2021 10:37 AM EDT  Subject: Prescription Question    Please change the pharmacy for my Metoprolol ER 25mg to [facility] ,not the [address] [place] [address] that you currently have it going to. All [facility]. Thank you!

## 2021-06-21 ENCOUNTER — OFFICE VISIT (OUTPATIENT)
Dept: INTERNAL MEDICINE CLINIC | Age: 71
End: 2021-06-21
Payer: MEDICARE

## 2021-06-21 ENCOUNTER — HOSPITAL ENCOUNTER (OUTPATIENT)
Dept: LAB | Age: 71
Discharge: HOME | End: 2021-06-21
Payer: MEDICARE

## 2021-06-21 VITALS
DIASTOLIC BLOOD PRESSURE: 84 MMHG | WEIGHT: 281.8 LBS | HEIGHT: 68 IN | RESPIRATION RATE: 20 BRPM | TEMPERATURE: 96.6 F | OXYGEN SATURATION: 98 % | SYSTOLIC BLOOD PRESSURE: 137 MMHG | BODY MASS INDEX: 42.71 KG/M2 | HEART RATE: 56 BPM

## 2021-06-21 DIAGNOSIS — I10 ESSENTIAL HYPERTENSION WITH GOAL BLOOD PRESSURE LESS THAN 140/90: Primary | ICD-10-CM

## 2021-06-21 DIAGNOSIS — Z12.31 ENCOUNTER FOR SCREENING MAMMOGRAM FOR MALIGNANT NEOPLASM OF BREAST: ICD-10-CM

## 2021-06-21 DIAGNOSIS — E78.5 DYSLIPIDEMIA: ICD-10-CM

## 2021-06-21 DIAGNOSIS — E66.01 OBESITY, MORBID (HCC): ICD-10-CM

## 2021-06-21 DIAGNOSIS — E03.4 HYPOTHYROIDISM DUE TO ACQUIRED ATROPHY OF THYROID: ICD-10-CM

## 2021-06-21 DIAGNOSIS — R42 DIZZINESS: ICD-10-CM

## 2021-06-21 DIAGNOSIS — Z00.00 MEDICARE ANNUAL WELLNESS VISIT, SUBSEQUENT: ICD-10-CM

## 2021-06-21 DIAGNOSIS — I10 ESSENTIAL HYPERTENSION WITH GOAL BLOOD PRESSURE LESS THAN 140/90: ICD-10-CM

## 2021-06-21 LAB
ALBUMIN SERPL-MCNC: 4 G/DL (ref 3.4–5)
ALBUMIN/GLOB SERPL: 1.3 {RATIO} (ref 0.8–1.7)
ALP SERPL-CCNC: 99 U/L (ref 45–117)
ALT SERPL-CCNC: 36 U/L (ref 13–56)
ANION GAP SERPL CALC-SCNC: 8 MMOL/L (ref 3–18)
APPEARANCE UR: CLEAR
AST SERPL-CCNC: 25 U/L (ref 10–38)
BACTERIA URNS QL MICRO: ABNORMAL /HPF
BASOPHILS # BLD: 0 K/UL (ref 0–0.1)
BASOPHILS NFR BLD: 1 % (ref 0–2)
BILIRUB SERPL-MCNC: 0.5 MG/DL (ref 0.2–1)
BILIRUB UR QL: NEGATIVE
BUN SERPL-MCNC: 22 MG/DL (ref 7–18)
BUN/CREAT SERPL: 19 (ref 12–20)
CALCIUM SERPL-MCNC: 9.2 MG/DL (ref 8.5–10.1)
CHLORIDE SERPL-SCNC: 104 MMOL/L (ref 100–111)
CHOLEST SERPL-MCNC: 269 MG/DL
CO2 SERPL-SCNC: 28 MMOL/L (ref 21–32)
COLOR UR: YELLOW
CREAT SERPL-MCNC: 1.17 MG/DL (ref 0.6–1.3)
DIFFERENTIAL METHOD BLD: NORMAL
EOSINOPHIL # BLD: 0.2 K/UL (ref 0–0.4)
EOSINOPHIL NFR BLD: 2 % (ref 0–5)
EPITH CASTS URNS QL MICRO: ABNORMAL /LPF (ref 0–5)
ERYTHROCYTE [DISTWIDTH] IN BLOOD BY AUTOMATED COUNT: 14.4 % (ref 11.6–14.5)
GLOBULIN SER CALC-MCNC: 3.2 G/DL (ref 2–4)
GLUCOSE SERPL-MCNC: 97 MG/DL (ref 74–99)
GLUCOSE UR STRIP.AUTO-MCNC: NEGATIVE MG/DL
HCT VFR BLD AUTO: 44.2 % (ref 35–45)
HDLC SERPL-MCNC: 60 MG/DL (ref 40–60)
HDLC SERPL: 4.5 {RATIO} (ref 0–5)
HGB BLD-MCNC: 14 G/DL (ref 12–16)
HGB UR QL STRIP: NEGATIVE
KETONES UR QL STRIP.AUTO: NEGATIVE MG/DL
LDLC SERPL CALC-MCNC: 177 MG/DL (ref 0–100)
LEUKOCYTE ESTERASE UR QL STRIP.AUTO: ABNORMAL
LIPID PROFILE,FLP: ABNORMAL
LYMPHOCYTES # BLD: 2.2 K/UL (ref 0.9–3.6)
LYMPHOCYTES NFR BLD: 32 % (ref 21–52)
MCH RBC QN AUTO: 28.6 PG (ref 24–34)
MCHC RBC AUTO-ENTMCNC: 31.7 G/DL (ref 31–37)
MCV RBC AUTO: 90.4 FL (ref 74–97)
MONOCYTES # BLD: 0.6 K/UL (ref 0.05–1.2)
MONOCYTES NFR BLD: 9 % (ref 3–10)
NEUTS SEG # BLD: 3.8 K/UL (ref 1.8–8)
NEUTS SEG NFR BLD: 56 % (ref 40–73)
NITRITE UR QL STRIP.AUTO: NEGATIVE
PH UR STRIP: 5.5 [PH] (ref 5–8)
PLATELET # BLD AUTO: 268 K/UL (ref 135–420)
PMV BLD AUTO: 11 FL (ref 9.2–11.8)
POTASSIUM SERPL-SCNC: 4.8 MMOL/L (ref 3.5–5.5)
PROT SERPL-MCNC: 7.2 G/DL (ref 6.4–8.2)
PROT UR STRIP-MCNC: NEGATIVE MG/DL
RBC # BLD AUTO: 4.89 M/UL (ref 4.2–5.3)
RBC #/AREA URNS HPF: NEGATIVE /HPF (ref 0–5)
SODIUM SERPL-SCNC: 140 MMOL/L (ref 136–145)
SP GR UR REFRACTOMETRY: 1.02 (ref 1–1.03)
T4 FREE SERPL-MCNC: 1.2 NG/DL (ref 0.7–1.5)
TRIGL SERPL-MCNC: 160 MG/DL (ref ?–150)
TSH SERPL DL<=0.05 MIU/L-ACNC: 2.41 UIU/ML (ref 0.36–3.74)
UROBILINOGEN UR QL STRIP.AUTO: 0.2 EU/DL (ref 0.2–1)
VLDLC SERPL CALC-MCNC: 32 MG/DL
WBC # BLD AUTO: 6.8 K/UL (ref 4.6–13.2)
WBC URNS QL MICRO: ABNORMAL /HPF (ref 0–4)

## 2021-06-21 PROCEDURE — G8510 SCR DEP NEG, NO PLAN REQD: HCPCS | Performed by: INTERNAL MEDICINE

## 2021-06-21 PROCEDURE — G8417 CALC BMI ABV UP PARAM F/U: HCPCS | Performed by: INTERNAL MEDICINE

## 2021-06-21 PROCEDURE — G8536 NO DOC ELDER MAL SCRN: HCPCS | Performed by: INTERNAL MEDICINE

## 2021-06-21 PROCEDURE — 84439 ASSAY OF FREE THYROXINE: CPT

## 2021-06-21 PROCEDURE — 36415 COLL VENOUS BLD VENIPUNCTURE: CPT

## 2021-06-21 PROCEDURE — G9899 SCRN MAM PERF RSLTS DOC: HCPCS | Performed by: INTERNAL MEDICINE

## 2021-06-21 PROCEDURE — 3017F COLORECTAL CA SCREEN DOC REV: CPT | Performed by: INTERNAL MEDICINE

## 2021-06-21 PROCEDURE — 1090F PRES/ABSN URINE INCON ASSESS: CPT | Performed by: INTERNAL MEDICINE

## 2021-06-21 PROCEDURE — G8399 PT W/DXA RESULTS DOCUMENT: HCPCS | Performed by: INTERNAL MEDICINE

## 2021-06-21 PROCEDURE — 80061 LIPID PANEL: CPT

## 2021-06-21 PROCEDURE — 85025 COMPLETE CBC W/AUTO DIFF WBC: CPT

## 2021-06-21 PROCEDURE — 1101F PT FALLS ASSESS-DOCD LE1/YR: CPT | Performed by: INTERNAL MEDICINE

## 2021-06-21 PROCEDURE — 99214 OFFICE O/P EST MOD 30 MIN: CPT | Performed by: INTERNAL MEDICINE

## 2021-06-21 PROCEDURE — G8752 SYS BP LESS 140: HCPCS | Performed by: INTERNAL MEDICINE

## 2021-06-21 PROCEDURE — G8754 DIAS BP LESS 90: HCPCS | Performed by: INTERNAL MEDICINE

## 2021-06-21 PROCEDURE — 80053 COMPREHEN METABOLIC PANEL: CPT

## 2021-06-21 PROCEDURE — 81001 URINALYSIS AUTO W/SCOPE: CPT

## 2021-06-21 PROCEDURE — G8427 DOCREV CUR MEDS BY ELIG CLIN: HCPCS | Performed by: INTERNAL MEDICINE

## 2021-06-21 PROCEDURE — G0439 PPPS, SUBSEQ VISIT: HCPCS | Performed by: INTERNAL MEDICINE

## 2021-06-21 NOTE — PROGRESS NOTES
Dennis Calvillo is a 72 y.o. female (: 1950) presenting to address:    Chief Complaint   Patient presents with    Annual Wellness Visit       Vitals:    21 0817   BP: 137/84   Pulse: (!) 56   Resp: 20   Temp: (!) 96.6 °F (35.9 °C)   TempSrc: Temporal   SpO2: 98%   Weight: 281 lb 12.8 oz (127.8 kg)   Height: 5' 8\" (1.727 m)   PainSc:   2   PainLoc: Leg       Hearing/Vision:   No exam data present    Learning Assessment:     Learning Assessment 2015   PRIMARY LEARNER Patient   HIGHEST LEVEL OF EDUCATION - PRIMARY LEARNER  [employer]   BARRIERS PRIMARY LEARNER NONE   PRIMARY LANGUAGE ENGLISH   LEARNER PREFERENCE PRIMARY DEMONSTRATION   ANSWERED BY Patient   RELATIONSHIP SELF     Depression Screening:     3 most recent PHQ Screens 2021   Little interest or pleasure in doing things Not at all   Feeling down, depressed, irritable, or hopeless Not at all   Total Score PHQ 2 0     Fall Risk Assessment:     Fall Risk Assessment, last 12 mths 2021   Able to walk? Yes   Fall in past 12 months? 0     Abuse Screening:     Abuse Screening Questionnaire 2019   Do you ever feel afraid of your partner? N   Are you in a relationship with someone who physically or mentally threatens you? N   Is it safe for you to go home? Y     Coordination of Care Questionaire:   1. Have you been to the ER, urgent care clinic since your last visit? Hospitalized since your last visit? NO    2. Have you seen or consulted any other health care providers outside of the [address] [place] [address] since your last visit? Include any pap smears or colon screening. YES Ortho    Advanced Directive:   1. Do you have an Advanced Directive? NO    2. Would you like information on Advanced Directives?  NO

## 2021-06-21 NOTE — PROGRESS NOTES
This is the Subsequent Medicare Annual Wellness Exam, performed 12 months or more after the Initial AWV or the last Subsequent AWV    I have reviewed the patient's medical history in detail and updated the computerized patient record. Assessment/Plan   Education and counseling provided:  Are appropriate based on today's review and evaluation    1. Medicare annual wellness visit, subsequent  2. Encounter for screening mammogram for malignant neoplasm of breast  -     [facility] MAMMO BI SCREENING INCL CAD; Future       Depression Risk Factor Screening     3 most recent PHQ Screens 6/21/2021   Little interest or pleasure in doing things Not at all   Feeling down, depressed, irritable, or hopeless Not at all   Total Score PHQ 2 0       Alcohol Risk Screen    Do you average more than 1 drink per night or more than 7 drinks a week:  No    On any one occasion in the past three months have you have had more than 3 drinks containing alcohol:  No        Functional Ability and Level of Safety    Hearing: Hearing is good. Activities of Daily Living: The home contains: no safety equipment. and grab bars and nightlight  Patient does total self care      Ambulation: with no difficulty and but is currently wearing a boot on left lower extremity so this limits walking currently     Fall Risk:  Fall Risk Assessment, last 12 mths 6/21/2021   Able to walk? Yes   Fall in past 12 months? 0      Abuse Screen:  Patient is not abused       Cognitive Screening    Has your family/caregiver stated any concerns about your memory: no     Cognitive Screening: Normal - Mini Cog Test    Health Maintenance Due     There are no preventive care reminders to display for this patient.     Patient Care Team   Patient Care Team:  Abby Conti MD as PCP - General (Internal Medicine)  Abby Conti MD as PCP - REHABILITATION [facility] Empaneled Provider  Itzel Haney MD as Consulting Provider (Ophthalmology)  Oneal Bray MD as Consulting Provider (Orthopedic Surgery)  Mireya Suggs MD as Consulting Provider (Orthopedic Surgery)  Dontae Logan (Physician Assistant)  Radha Kaye MD (Orthopedic Surgery)    History     Patient Active Problem List   Diagnosis Code    Hypothyroidism due to acquired atrophy of thyroid E03.4    Essential hypertension I10    Obesity, morbid ([facility] [address] E66.01    CKD (chronic kidney disease) stage 3, GFR 30-59 ml/min ([facility] [address] N18.30     Past Medical History:   Diagnosis Date    Colon polyps 07/10/2017    hyperplastic    Delivery normal 0519,3134,1268    DJD (degenerative joint disease) of knee     hiatal hernia     Immunization, viral disease unk    hepatitis B series    Rotator cuff tear 08/08/2019    right, complete tear    Thyroid disease       Past Surgical History:   Procedure Laterality Date    ENDOSCOPY, COLON, DIAGNOSTIC  2007    HAND/FINGER SURGERY UNLISTED  2008    trigger finger    HX CARPAL TUNNEL RELEASE Left 03/05/2020    HX CATARACT REMOVAL Left 01/08/2020    HX COLONOSCOPY  07/10/2017    HX CYSTOCELE REPAIR      HX HYSTERECTOMY  1983    partial    HX KNEE ARTHROSCOPY Left 9/21/15    Dr. Weaver Patella      Dr. Lurdes Fortune    HX ORTHOPAEDIC Left 03/04/2020    left cubital tunnel release    HX ROTATOR CUFF REPAIR Right 08/09/2018    HX ROTATOR CUFF REPAIR Right 08/08/2019    Dr. Leighann Romero AK NASAL SURG PROC UNLISTED      polyps    AK REMOVAL GALLBLADDER      AK REPAIR OF RECTOCELE      AK REV LOWER EYELID EXTEN FAT PAD  9/18/2013     Current Outpatient Medications   Medication Sig Dispense Refill    metoprolol succinate (Toprol XL) 25 mg XL tablet Take 1 Tab by mouth daily. 90 Tab 3    levothyroxine (Synthroid) 100 mcg tablet TAKE 2 TABLETS BEFORE BREAKFAST ON SUNDAY, WEDNESDAY AND FRIDAY.  TAKE 1 TABLET BEFORE BREAKFAST ON ALL OTHER DAYS 126 Tab 3    diclofenac EC (VOLTAREN) 50 mg EC tablet TAKE 1 TABLET TWICE A DAY FOR OSTEOARTHRITIS 180 Tab 3    ibuprofen (MOTRIN) 600 mg tablet Take 1 Tab by mouth every six (6) hours as needed for Pain. 120 Tab 1    allopurinoL (ZYLOPRIM) 100 mg tablet TAKE 1 TABLET DAILY 90 Tab 3    amLODIPine (NORVASC) 5 mg tablet TAKE 1 TABLET DAILY FOR HYPERTENSION 90 Tab 3    nystatin-triamcinolone (MYCOLOG II) topical cream Apply  to affected area two (2) times a day. (Patient taking differently: Apply  to affected area two (2) times daily as needed.) 60 g 11    aspirin delayed-release 81 mg tablet 81 mg.      coenzyme q10 (CO Q-10) 10 mg cap Take  by mouth.  triamterene-hydroCHLOROthiazide (MAXZIDE) 75-50 mg per tablet Take 1 Tab by mouth daily as needed. Indications: Edema 90 Tab 5    gabapentin (Neurontin) 100 mg capsule Take 100 mg by mouth nightly. (Patient not taking: Reported on 2021)      albuterol (PROVENTIL HFA, VENTOLIN HFA, PROAIR HFA) 90 mcg/actuation inhaler Take 2 Puffs by inhalation every four (4) hours as needed for Wheezing.  (Patient not taking: Reported on 2021) 1 Inhaler 1     Allergies   Allergen Reactions    Erythromycin Swelling    Statins-Hmg-Coa Reductase Inhibitors Myalgia       Family History   Problem Relation Age of Onset    Hypertension Mother    Markana Hararianne Diabetes Mother    Cathie Brown Arthritis-osteo Mother     Thyroid Disease Sister     Thyroid Disease Brother      Social History     Tobacco Use    Smoking status: Former Smoker     Packs/day: 0.50     Years: 15.00     Pack years: 7.50     Types: Cigarettes     Quit date: 2003     Years since quittin.0    Smokeless tobacco: Never Used    Tobacco comment: Quit 15 years ago   Substance Use Topics    Alcohol use: No         Fransisca Brunner, MD

## 2021-06-21 NOTE — PROGRESS NOTES
HISTORY OF PRESENT ILLNESS  Boo Soto is a 72 y.o. female. /84 (BP 1 Location: Right upper arm, BP Patient Position: Sitting, BP Cuff Size: Large adult)   Pulse (!) 56   Temp (!) 96.6 °F (35.9 °C) (Temporal)   Resp 20   Ht 5' 8\" (1.727 m)   Wt 281 lb 12.8 oz (127.8 kg)   SpO2 98%   BMI 42.85 kg/m²     Thyroid Problem  The history is provided by the patient. This is a chronic problem. The current episode started more than 1 week ago. The problem occurs daily. Pertinent negatives include no chest pain and no shortness of breath. Hypertension   The history is provided by the patient. This is a chronic problem. The current episode started more than 1 week ago. The problem has not changed since onset. Associated symptoms include dizziness (tends to occur after voiding in the night, when she goes back to bed.). Pertinent negatives include no chest pain, no palpitations and no shortness of breath. Review of Systems   Constitutional: Negative for chills and fever. Respiratory: Negative for cough and shortness of breath. Cardiovascular: Negative for chest pain and palpitations. Neurological: Positive for dizziness (tends to occur after voiding in the night, when she goes back to bed.). Physical Exam  Vitals and nursing note reviewed. Constitutional:       Appearance: Normal appearance. She is well-developed. HENT:      Head: Normocephalic and atraumatic. Neck:      Vascular: No carotid bruit. Cardiovascular:      Rate and Rhythm: Normal rate and regular rhythm. Pulmonary:      Effort: Pulmonary effort is normal.      Breath sounds: Normal breath sounds. Skin:     General: Skin is warm and dry. Neurological:      General: No focal deficit present. Mental Status: She is alert and oriented to person, place, and time. Psychiatric:         Mood and Affect: Mood normal.         Behavior: Behavior normal.         ASSESSMENT and PLAN    ICD-10-CM ICD-9-CM    1.  Essential hypertension with goal blood pressure less than 140/90  F48 598.3 METABOLIC PANEL, COMPREHENSIVE      LIPID PANEL      URINALYSIS W/ RFLX MICROSCOPIC   2. Hypothyroidism due to acquired atrophy of thyroid  E03.4 244.8 TSH AND FREE T4     246.8    3. Dyslipidemia  E78.5 272.4 LIPID PANEL   4. Obesity, morbid ([facility] [address]  E66.01 278.01    5. Dizziness  R42 780.4 CBC WITH AUTOMATED DIFF       BP controlled    Obesity. Discussed BMI/weight, lifestyle, diet and exercise. Discussed effect on blood pressure, blood sugar, and joints especially  Focus on limiting white carbs, portion control, and moving more. This year has been bad for all, and now she can't exercise much due to leg injury    Dizziness--? Etiology. Does not sound like vertigo. No other associated sxs.  ? Related to mild vasovagal after voiding    Update lab today    F/u 6 month for HTN, chol, thyroid

## 2021-06-21 NOTE — PATIENT INSTRUCTIONS
Medicare Wellness Visit, Female     The best way to live healthy is to have a lifestyle where you eat a well-balanced diet, exercise regularly, limit alcohol use, and quit all forms of tobacco/nicotine, if applicable. Regular preventive services are another way to keep healthy. Preventive services (vaccines, screening tests, monitoring & exams) can help personalize your care plan, which helps you manage your own care. Screening tests can find health problems at the earliest stages, when they are easiest to treat. [facility] follows the current, evidence-based guidelines published by the [facility] ([facility]) when recommending preventive services for our patients. Because we follow these guidelines, sometimes recommendations change over time as research supports it. (For example, mammograms used to be recommended annually. Even though Medicare will still pay for an annual mammogram, the newer guidelines recommend a mammogram every two years for women of average risk). Of course, you and your doctor may decide to screen more often for some diseases, based on your risk and your co-morbidities (chronic disease you are already diagnosed with). Preventive services for you include:  - Medicare offers their members a free annual wellness visit, which is time for you and your primary care provider to discuss and plan for your preventive service needs. Take advantage of this benefit every year!  -All adults over the age of 72 should receive the recommended pneumonia vaccines. Current USPSTF guidelines recommend a series of two vaccines for the best pneumonia protection.   -All adults should have a flu vaccine yearly and a tetanus vaccine every 10 years.   -All adults age 48 and older should receive the shingles vaccines (series of two vaccines).       -All adults age 38-68 who are overweight should have a diabetes screening test once every three years.   -All adults born between [birth year] and 1964 should be screened once for Hepatitis C.  -Other screening tests and preventive services for persons with diabetes include: an eye exam to screen for diabetic retinopathy, a kidney function test, a foot exam, and stricter control over your cholesterol.   -Cardiovascular screening for adults with routine risk involves an electrocardiogram (ECG) at intervals determined by your doctor.   -Colorectal cancer screenings should be done for adults age 54-65 with no increased risk factors for colorectal cancer. There are a number of acceptable methods of screening for this type of cancer. Each test has its own benefits and drawbacks. Discuss with your doctor what is most appropriate for you during your annual wellness visit. The different tests include: colonoscopy (considered the best screening method), a fecal occult blood test, a fecal DNA test, and sigmoidoscopy.    -A bone mass density test is recommended when a woman turns 65 to screen for osteoporosis. This test is only recommended one time, as a screening. Some providers will use this same test as a disease monitoring tool if you already have osteoporosis. -Breast cancer screenings are recommended every other year for women of normal risk, age 54-69.  -Cervical cancer screenings for women over age 72 are only recommended with certain risk factors. Here is a list of your current Health Maintenance items (your personalized list of preventive services) with a due date: There are no preventive care reminders to display for this patient.

## 2021-08-06 DIAGNOSIS — Z12.31 ENCOUNTER FOR SCREENING MAMMOGRAM FOR MALIGNANT NEOPLASM OF BREAST: ICD-10-CM

## 2021-08-23 DIAGNOSIS — Z76.0 MEDICATION REFILL: ICD-10-CM

## 2021-08-23 RX ORDER — NYSTATIN AND TRIAMCINOLONE ACETONIDE 100000; 1 [USP'U]/G; MG/G
CREAM TOPICAL 2 TIMES DAILY
Qty: 60 G | Refills: 11 | Status: SHIPPED | OUTPATIENT
Start: 2021-08-23

## 2021-09-06 DIAGNOSIS — I10 ESSENTIAL HYPERTENSION WITH GOAL BLOOD PRESSURE LESS THAN 140/90: Chronic | ICD-10-CM

## 2021-09-06 RX ORDER — AMLODIPINE BESYLATE 5 MG/1
TABLET ORAL
Qty: 90 TABLET | Refills: 3 | Status: SHIPPED | OUTPATIENT
Start: 2021-09-06

## 2021-09-14 ENCOUNTER — IMPORTED ENCOUNTER (OUTPATIENT)
Dept: URBAN - METROPOLITAN AREA CLINIC 1 | Facility: CLINIC | Age: 71
End: 2021-09-14

## 2021-09-14 PROBLEM — H40.023: Noted: 2021-09-14

## 2021-09-14 PROBLEM — H04.123: Noted: 2021-09-14

## 2021-09-14 PROBLEM — H16.143: Noted: 2021-09-14

## 2021-09-14 PROBLEM — H26.493: Noted: 2021-09-14

## 2021-09-14 PROCEDURE — 92015 DETERMINE REFRACTIVE STATE: CPT

## 2021-09-14 PROCEDURE — 99214 OFFICE O/P EST MOD 30 MIN: CPT

## 2021-09-14 PROCEDURE — 92133 CPTRZD OPH DX IMG PST SGM ON: CPT

## 2021-09-14 NOTE — PATIENT DISCUSSION
1.  Glaucoma Suspect OU -- (CD 0.80 OU): No significant change shown on OCT. IOP stable. Family hx. Patient is considered high risk. Condition was discussed with patient and patient understands. Will continue to monitor patient for any progression in condition. Patient was advised to call us with any problems questions or concerns. 2.  CANDY w/ PEK OU -- Recommend continue ATs QID OU routinely 3. PCO OU -- (Posterior Capsule Opacification)   Observe and consider yag cap when pt feels pco visually significant and visual acuity decreases to appropriate level. 4. Pseudophakia OU -- (Standard OU) 5. Anterior Blepharitis OU -- Daily Hot compresses and lid scrubs were recommended. 6. GR I Hypertensive Retinopathy OU- Stable continue HTN Control MRX for glasses given. Return for an appointment PRN (pt is moving to [address] [place] [address] pt follow up with an Ophthalmologist in a year) with Dr. Lu Pettit.

## 2021-11-12 DIAGNOSIS — Z76.0 MEDICATION REFILL: ICD-10-CM

## 2021-11-12 RX ORDER — ALLOPURINOL 100 MG/1
TABLET ORAL
Qty: 90 TABLET | Refills: 3 | Status: SHIPPED | OUTPATIENT
Start: 2021-11-12

## 2022-02-15 DIAGNOSIS — M19.91 PRIMARY OSTEOARTHRITIS, UNSPECIFIED SITE: ICD-10-CM

## 2022-02-15 RX ORDER — DICLOFENAC SODIUM 50 MG/1
TABLET, DELAYED RELEASE ORAL
Qty: 180 TABLET | Refills: 3 | Status: SHIPPED | OUTPATIENT
Start: 2022-02-15

## 2022-04-08 ASSESSMENT — TONOMETRY
OS_IOP_MMHG: 21
OS_IOP_MMHG: 17
OS_IOP_MMHG: 15
OD_IOP_MMHG: 15
OS_IOP_MMHG: 18
OD_IOP_MMHG: 16
OD_IOP_MMHG: 16
OS_IOP_MMHG: 17
OS_IOP_MMHG: 16
OS_IOP_MMHG: 16
OS_IOP_MMHG: 20
OD_IOP_MMHG: 17
OD_IOP_MMHG: 16
OS_IOP_MMHG: 16
OD_IOP_MMHG: 16
OD_IOP_MMHG: 17
OS_IOP_MMHG: 16
OD_IOP_MMHG: 18

## 2022-04-08 ASSESSMENT — VISUAL ACUITY
OD_GLARE: 20/60
OS_SC: 20/20
OS_CC: 20/50
OD_CC: J2
OD_GLARE: 20/60
OS_CC: 20/40-2
OS_SC: 20/40
OS_CC: J2
OD_CC: 20/30
OD_CC: J1
OS_GLARE: 20/80
OD_CC: 20/40+1
OD_GLARE: 20/50
OD_SC: 20/40+1
OD_GLARE: 20/100
OS_CC: 20/40
OD_GLARE: 20/100
OS_SC: 20/25
OS_CC: 20/50
OD_SC: 20/40+1
OD_SC: 20/30
OD_CC: 20/40+1
OS_SC: 20/25-2
OS_CC: 20/60-1
OS_GLARE: 20/60
OD_SC: 20/25-1
OS_GLARE: 20/80
OD_SC: 20/20
OS_CC: J1
OS_SC: 20/25
OD_CC: 20/30
OS_SC: 20/30-1
OS_GLARE: 20/60
OS_GLARE: 20/60
OD_SC: 20/30
OS_CC: 20/50
OS_CC: J2
OD_CC: J2
OS_SC: 20/40
OD_SC: 20/30-2